# Patient Record
Sex: FEMALE | Race: WHITE | NOT HISPANIC OR LATINO | Employment: PART TIME | URBAN - METROPOLITAN AREA
[De-identification: names, ages, dates, MRNs, and addresses within clinical notes are randomized per-mention and may not be internally consistent; named-entity substitution may affect disease eponyms.]

---

## 2021-04-05 ENCOUNTER — INITIAL PRENATAL (OUTPATIENT)
Dept: OBSTETRICS AND GYNECOLOGY | Facility: CLINIC | Age: 31
End: 2021-04-05

## 2021-04-05 VITALS — WEIGHT: 172 LBS | DIASTOLIC BLOOD PRESSURE: 72 MMHG | SYSTOLIC BLOOD PRESSURE: 110 MMHG

## 2021-04-05 DIAGNOSIS — Z3A.08 8 WEEKS GESTATION OF PREGNANCY: Primary | ICD-10-CM

## 2021-04-05 LAB — PROT UR STRIP-MCNC: NEGATIVE MG/DL

## 2021-04-05 PROCEDURE — 99204 OFFICE O/P NEW MOD 45 MIN: CPT | Performed by: OBSTETRICS & GYNECOLOGY

## 2021-04-05 RX ORDER — PRENATAL VIT/IRON FUM/FOLIC AC 27MG-0.8MG
TABLET ORAL DAILY
COMMUNITY
End: 2022-12-12

## 2021-04-05 NOTE — PROGRESS NOTES
Initial ob visit     CC- Here for care of pregnancy        Mady Brown is a 30 y.o. female, , who presents for her first obstetrical visit.  Her last LMP was No LMP recorded. Patient is pregnant. Pt having some nausea, no vomiting. Taking OTC PNV. No c/o.    OB History    Para Term  AB Living   2 1 1     1   SAB TAB Ectopic Molar Multiple Live Births             1      # Outcome Date GA Lbr Roberto/2nd Weight Sex Delivery Anes PTL Lv   2 Current            1 Term      Vag-Spont   LAI       Initial positive test date : 3/4/21,        Prior obstetric issues, potential pregnancy concerns: none  Family history of genetic issues (includes FOB): none  Prior infections concerning in pregnancy (Rash, fever in last 2 weeks): none  Varicella Hx -never had virus-had vaccine  Prior testing for Cystic Fibrosis Carrier or Sickle Cell Trait- none  Prepregnancy BMI - There is no height or weight on file to calculate BMI.  History of STD: no    Additional Pertinent History   Last Pap :   Last Completed Pap Smear       Status Date      PAP SMEAR No completions recorded        History of abnormal Pap smear: no  Family history of uterine, colon, breast, or ovarian cancer: no  Performs monthly Self-Breast Exam: yes  Exercises Regularly: yes  Feelings of Anxiety or Depression: no  Tobacco Usage?: No   Alcohol/Drug Use?: NO  Over the age of 35 at delivery: no  Desires Genetic Screening: will think about it.    Patient's preferred name:Mady Occupation: homemaker  FOB's name: Danial     Occupation: student    [employer]  Past Medical History:   Diagnosis Date   • Ovarian cyst        Current Outpatient Medications:   •  Prenatal Vit-Fe Fumarate-FA (prenatal vitamin 27-0.8) 27-0.8 MG tablet tablet, Take  by mouth Daily., Disp: , Rfl:     The additional following portions of the patient's history were reviewed and updated as appropriate: allergies, current medications, past family history, past medical history, past social  history, past surgical history and problem list.    Review of Systems   Review of Systems  Current obstetric complaints : Nausea and fatigue   All systems reviewed and otherwise normal.    I have reviewed and agree with the HPI, ROS, and historical information as entered above. Radha Serrano MD    /72   Wt 78 kg (172 lb)     Physical Exam  General Appearance:    Alert, cooperative, in no acute distress,    Head:    Normocephalic, without obvious abnormality, atraumatic   Neck:   No adenopathy, supple, trachea midline, no thyromegaly   Back:     No kyphosis present, no scoliosis present,                       Lungs:     Clear to auscultation,respirations regular, even and     unlabored    Heart:    Regular rhythm and normal rate, normal S1 and S2, no            murmur, no gallop, no rub, no click       Abdomen:     Normal bowel sounds, no masses, no organomegaly, soft        non-tender, non-distended, no guarding, no rebound                 tenderness       Extremities:   Moves all extremities well, no edema, no cyanosis, no         redness   Pulses:   Pulses palpable and equal bilaterally   Skin:   No bleeding, bruising or rash   Lymph nodes:   No palpable adenopathy   Neurologic:   Sensation intact, A&O times 3      Physical Exam has been reviewed and confirmed by Radha Serrano MD    Objective   Lab Review   No data reviewed    Imaging   No data reviewed    Assessment/Plan   ASSESSMENT  1. 30 y.o. year old  at 8w4d  2. Supervision of low risk pregnancy    PLAN  1. The problem list for pregnancy was initiated today  2. Tests ordered today:  Orders Placed This Encounter   Procedures   • Urine Culture - Urine, Urine, Clean Catch     Order Specific Question:   Release to patient     Answer:   Immediate   • Chlamydia trachomatis, Neisseria gonorrhoeae, PCR w/ confirmation - Urine, Urine, Clean Catch     Order Specific Question:   Release to patient     Answer:   Immediate   • Obstetric Panel      Order Specific Question:   Release to patient     Answer:   Immediate   • HIV-1 / O / 2 Ag / Antibody 4th Generation     Order Specific Question:   Release to patient     Answer:   Immediate   • Urine Drug Screen - Urine, Clean Catch     Please confirm all positive UDS     Order Specific Question:   Release to patient     Answer:   Immediate   • POC Urinalysis Dipstick, Automated     Order Specific Question:   Release to patient     Answer:   Immediate     3. Genetic testing reviewed: she will consider the information and make a decision at a later date.  4. Information reviewed: exercise in pregnancy, nutrition in pregnancy, weight gain in pregnancy, work and travel restrictions during pregnancy, list of OTC medications acceptable in pregnancy and call coverage groups  5. Discussed new OB precautions for toxoplasmosis, dairy, heavy metals and diet/exercise.  3. Return in about 4 weeks (around 5/3/2021).     This note was electronically signed.    Radha Serrano MD  April 5, 2021

## 2021-04-07 LAB
ABO GROUP BLD: NORMAL
AMPHETAMINES UR QL SCN: NEGATIVE NG/ML
BACTERIA UR CULT: NORMAL
BACTERIA UR CULT: NORMAL
BARBITURATES UR QL SCN: NEGATIVE NG/ML
BASOPHILS # BLD AUTO: 0 X10E3/UL (ref 0–0.2)
BASOPHILS NFR BLD AUTO: 1 %
BENZODIAZ UR QL SCN: NEGATIVE NG/ML
BLD GP AB SCN SERPL QL: NEGATIVE
BZE UR QL SCN: NEGATIVE NG/ML
C TRACH RRNA SPEC QL NAA+PROBE: NEGATIVE
CANNABINOIDS UR QL SCN: NEGATIVE NG/ML
CREAT UR-MCNC: 230.3 MG/DL (ref 20–300)
EOSINOPHIL # BLD AUTO: 0.1 X10E3/UL (ref 0–0.4)
EOSINOPHIL NFR BLD AUTO: 2 %
ERYTHROCYTE [DISTWIDTH] IN BLOOD BY AUTOMATED COUNT: 12.4 % (ref 11.7–15.4)
HBV SURFACE AG SERPL QL IA: NEGATIVE
HCT VFR BLD AUTO: 42.2 % (ref 34–46.6)
HCV AB S/CO SERPL IA: <0.1 S/CO RATIO (ref 0–0.9)
HGB BLD-MCNC: 14.2 G/DL (ref 11.1–15.9)
HIV 1+2 AB+HIV1 P24 AG SERPL QL IA: NON REACTIVE
IMM GRANULOCYTES # BLD AUTO: 0 X10E3/UL (ref 0–0.1)
IMM GRANULOCYTES NFR BLD AUTO: 0 %
LABORATORY COMMENT REPORT: NORMAL
LYMPHOCYTES # BLD AUTO: 0.9 X10E3/UL (ref 0.7–3.1)
LYMPHOCYTES NFR BLD AUTO: 17 %
MCH RBC QN AUTO: 30.6 PG (ref 26.6–33)
MCHC RBC AUTO-ENTMCNC: 33.6 G/DL (ref 31.5–35.7)
MCV RBC AUTO: 91 FL (ref 79–97)
METHADONE UR QL SCN: NEGATIVE NG/ML
MONOCYTES # BLD AUTO: 0.4 X10E3/UL (ref 0.1–0.9)
MONOCYTES NFR BLD AUTO: 9 %
N GONORRHOEA RRNA SPEC QL NAA+PROBE: NEGATIVE
NEUTROPHILS # BLD AUTO: 3.6 X10E3/UL (ref 1.4–7)
NEUTROPHILS NFR BLD AUTO: 71 %
OPIATES UR QL SCN: NEGATIVE NG/ML
OXYCODONE+OXYMORPHONE UR QL SCN: NEGATIVE NG/ML
PCP UR QL: NEGATIVE NG/ML
PH UR: 5.7 [PH] (ref 4.5–8.9)
PLATELET # BLD AUTO: 202 X10E3/UL (ref 150–450)
PROPOXYPH UR QL SCN: NEGATIVE NG/ML
RBC # BLD AUTO: 4.64 X10E6/UL (ref 3.77–5.28)
RH BLD: POSITIVE
RPR SER QL: NON REACTIVE
RUBV IGG SERPL IA-ACNC: 4.47 INDEX
WBC # BLD AUTO: 5.1 X10E3/UL (ref 3.4–10.8)

## 2021-04-20 ENCOUNTER — PATIENT MESSAGE (OUTPATIENT)
Dept: OBSTETRICS AND GYNECOLOGY | Facility: CLINIC | Age: 31
End: 2021-04-20

## 2021-04-20 NOTE — TELEPHONE ENCOUNTER
From: Mady Brown  To: Radha Serrano MD  Sent: 4/20/2021 11:55 AM EDT  Subject: Non-Urgent Medical Question    I apologize if this is under the wrong subject category. I spoke with my doctor about pediatricians in the area that would accept Medicaid/Passport program. We are new to the area and I needed an opinion I can trust. I am having a hard time locating the [facility] doctor she recommended and can't remember the other two. I wanted to follow up and see if there is a recommendation that you can give me. Thank you!!

## 2021-05-03 ENCOUNTER — ROUTINE PRENATAL (OUTPATIENT)
Dept: OBSTETRICS AND GYNECOLOGY | Facility: CLINIC | Age: 31
End: 2021-05-03

## 2021-05-03 VITALS — WEIGHT: 171 LBS | SYSTOLIC BLOOD PRESSURE: 120 MMHG | DIASTOLIC BLOOD PRESSURE: 60 MMHG

## 2021-05-03 DIAGNOSIS — Z3A.12 12 WEEKS GESTATION OF PREGNANCY: Primary | ICD-10-CM

## 2021-05-03 LAB
GLUCOSE UR STRIP-MCNC: NEGATIVE MG/DL
PROT UR STRIP-MCNC: NEGATIVE MG/DL

## 2021-05-03 PROCEDURE — 99213 OFFICE O/P EST LOW 20 MIN: CPT | Performed by: OBSTETRICS & GYNECOLOGY

## 2021-05-07 LAB
CFDNA.FET/CFDNA.TOTAL SFR FETUS: NORMAL %
CITATION REF LAB TEST: NORMAL
FET 13+18+21+X+Y ANEUP PLAS.CFDNA: NEGATIVE
FET CHR 21 TS PLAS.CFDNA QL: NEGATIVE
FET SEX PLAS.CFDNA DOSAGE CFDNA: NORMAL
FET TS 13 RISK PLAS.CFDNA QL: NEGATIVE
FET TS 18 RISK WBC.DNA+CFDNA QL: NEGATIVE
GA EST FROM CONCEPTION DATE: NORMAL D
GESTATIONAL AGE > 9:: YES
LAB DIRECTOR NAME PROVIDER: NORMAL
LAB DIRECTOR NAME PROVIDER: NORMAL
LABORATORY COMMENT REPORT: NORMAL
LIMITATIONS OF THE TEST: NORMAL
NEGATIVE PREDICTIVE VALUE: NORMAL
NOTE: NORMAL
PERFORMANCE CHARACTERISTICS: NORMAL
POSITIVE PREDICTIVE VALUE: NORMAL
REF LAB TEST METHOD: NORMAL
TEST PERFORMANCE INFO SPEC: NORMAL

## 2021-05-20 PROBLEM — Z3A.12 12 WEEKS GESTATION OF PREGNANCY: Status: ACTIVE | Noted: 2021-05-20

## 2021-06-07 ENCOUNTER — ROUTINE PRENATAL (OUTPATIENT)
Dept: OBSTETRICS AND GYNECOLOGY | Facility: CLINIC | Age: 31
End: 2021-06-07

## 2021-06-07 VITALS — DIASTOLIC BLOOD PRESSURE: 60 MMHG | SYSTOLIC BLOOD PRESSURE: 110 MMHG | WEIGHT: 175 LBS

## 2021-06-07 DIAGNOSIS — O09.293 HISTORY OF MACROSOMIA IN INFANT IN PRIOR PREGNANCY, CURRENTLY PREGNANT IN THIRD TRIMESTER: ICD-10-CM

## 2021-06-07 DIAGNOSIS — Z3A.17 17 WEEKS GESTATION OF PREGNANCY: Primary | ICD-10-CM

## 2021-06-07 LAB
GLUCOSE UR STRIP-MCNC: NEGATIVE MG/DL
PROT UR STRIP-MCNC: NEGATIVE MG/DL

## 2021-06-07 PROCEDURE — 99212 OFFICE O/P EST SF 10 MIN: CPT | Performed by: OBSTETRICS & GYNECOLOGY

## 2021-06-07 NOTE — PROGRESS NOTES
OB FOLLOW UP    Mady Brown is a 30 y.o.  17w4d patient being seen today for her obstetrical follow up visit. Patient reports no complaints.     Her prenatal care is complicated by (and status) :    Patient Active Problem List   Diagnosis   • 8 weeks gestation of pregnancy   • 12 weeks gestation of pregnancy   • 17 weeks gestation of pregnancy   • History of macrosomia in infant in prior pregnancy, currently pregnant in third trimester       ROS -   Patient Reports : No Problems  Patient Denies: Loss of Fluid, Vaginal Spotting and Vision Changes  Fetal Movement : not at this gestation      /60   Wt 79.4 kg (175 lb)     Ultrasound Today: no    EXAM:  Vitals: See prenatal flowsheet   Abdomen: See prenatal flowsheet   Urine glucose/protein: See prenatal flowsheet   Pelvic: See prenatal flowsheet     Assessment    Diagnoses and all orders for this visit:    1. 17 weeks gestation of pregnancy (Primary)  -     POC Urinalysis Dipstick, Automated  -     US Ob 14 + Weeks Single or First Gestation; Future    2. History of macrosomia in infant in prior pregnancy, currently pregnant in third trimester        1. Pregnancy at 17w4d  2. Fetal status reassuring     Problem List Items Addressed This Visit     17 weeks gestation of pregnancy - Primary    Relevant Orders    POC Urinalysis Dipstick, Automated (Completed)    US Ob 14 + Weeks Single or First Gestation    History of macrosomia in infant in prior pregnancy, currently pregnant in third trimester          Plan    1. Fetal movement/ Labor Precautions  2. Declines AFP today  Return in about 3 weeks (around 2021) for Next scheduled follow up, WITH SONO.        Radha Serrano MD  2021

## 2021-06-28 ENCOUNTER — ROUTINE PRENATAL (OUTPATIENT)
Dept: OBSTETRICS AND GYNECOLOGY | Facility: CLINIC | Age: 31
End: 2021-06-28

## 2021-06-28 VITALS — WEIGHT: 175.6 LBS | DIASTOLIC BLOOD PRESSURE: 64 MMHG | SYSTOLIC BLOOD PRESSURE: 108 MMHG

## 2021-06-28 DIAGNOSIS — O26.899 PELVIC PRESSURE IN PREGNANCY: ICD-10-CM

## 2021-06-28 DIAGNOSIS — Z3A.20 20 WEEKS GESTATION OF PREGNANCY: Primary | ICD-10-CM

## 2021-06-28 DIAGNOSIS — R10.2 PELVIC PRESSURE IN PREGNANCY: ICD-10-CM

## 2021-06-28 LAB
BILIRUB BLD-MCNC: NEGATIVE MG/DL
CLARITY, POC: CLEAR
COLOR UR: NORMAL
EXPIRATION DATE: NORMAL
GLUCOSE UR STRIP-MCNC: NEGATIVE MG/DL
GLUCOSE UR STRIP-MCNC: NEGATIVE MG/DL
KETONES UR QL: NEGATIVE
LEUKOCYTE EST, POC: NEGATIVE
Lab: NORMAL
NITRITE UR-MCNC: NEGATIVE MG/ML
PH UR: 7 [PH] (ref 5–8)
PROT UR STRIP-MCNC: NEGATIVE MG/DL
PROT UR STRIP-MCNC: NEGATIVE MG/DL
RBC # UR STRIP: NEGATIVE /UL
SP GR UR: 1.02 (ref 1–1.03)
UROBILINOGEN UR QL: NORMAL

## 2021-06-28 PROCEDURE — 99213 OFFICE O/P EST LOW 20 MIN: CPT | Performed by: NURSE PRACTITIONER

## 2021-06-28 NOTE — PROGRESS NOTES
OB FOLLOW UP  CC- Here for care of pregnancy        Mady Brown is a 30 y.o.  20w4d patient being seen today for her obstetrical follow up visit. Patient reports increased pelvic and vaginal discomfort within the last week.  She denies dysuria or abnormal vaginal discharge.   She also reports mild swelling in her ankles, and occasional dizzy spells.     She denies LOF, vaginal spotting, headaches, vision changes or charlie rutherford/contractions.  She reports + fetal movement.     Her prenatal care is complicated by (and status) :    Patient Active Problem List   Diagnosis   • 8 weeks gestation of pregnancy   • 12 weeks gestation of pregnancy   • 17 weeks gestation of pregnancy   • History of macrosomia in infant in prior pregnancy, currently pregnant in third trimester     Ultrasound Today: Yes, anatomy scan.  Findings showed all anatomy appears normal, cervical length 57 mm.  I have personally evaluated the U/S and agree with the findings. CULLEN Iniguez    ROS -   Patient Reports : pelvic/vaginal discomfort, dizzy spells, swelling  Patient Denies: Loss of Fluid, Vaginal Spotting, Vision Changes, Headaches, Nausea , Vomiting , Contractions and Epigastric pain  Fetal Movement : normal  All other systems reviewed and are negative.       The additional following portions of the patient's history were reviewed and updated as appropriate: allergies, current medications, past family history, past medical history, past social history, past surgical history and problem list.    I have reviewed and agree with the HPI, ROS, and historical information as entered above. CULLEN Iniguez    /64   Wt 79.7 kg (175 lb 9.6 oz)       EXAM:     FHT: pos BPM     Pelvic Exam: No wp/koh    Urine glucose/protein: See prenatal flowsheet       Assessment and Plan    Problem List Items Addressed This Visit     None      Visit Diagnoses     20 weeks gestation of pregnancy    -  Primary    Relevant Orders    POC  Protein, Urine, Qualitative, Dipstick (Completed)    POC Glucose, Urine, Qualitative, Dipstick (Completed)    POC Urinalysis Dipstick (Completed)    Pelvic pressure in pregnancy        Relevant Orders    POC Urinalysis Dipstick (Completed)          1. Pregnancy at 20w4d  2. Fetal status reassuring.   3. Activity and Exercise discussed.  Return in about 4 weeks (around 7/26/2021) for LAYO REAL.   CCUA negative.  KOH with few yeast buds.  Treat with T3    Chasity Martinez, APRN  06/28/2021

## 2021-07-26 ENCOUNTER — ROUTINE PRENATAL (OUTPATIENT)
Dept: OBSTETRICS AND GYNECOLOGY | Facility: CLINIC | Age: 31
End: 2021-07-26

## 2021-07-26 VITALS — SYSTOLIC BLOOD PRESSURE: 110 MMHG | WEIGHT: 176 LBS | DIASTOLIC BLOOD PRESSURE: 72 MMHG

## 2021-07-26 DIAGNOSIS — Z3A.24 24 WEEKS GESTATION OF PREGNANCY: Primary | ICD-10-CM

## 2021-07-26 DIAGNOSIS — O09.293 HISTORY OF MACROSOMIA IN INFANT IN PRIOR PREGNANCY, CURRENTLY PREGNANT IN THIRD TRIMESTER: ICD-10-CM

## 2021-07-26 DIAGNOSIS — M54.30 SCIATIC PAIN, UNSPECIFIED LATERALITY: ICD-10-CM

## 2021-07-26 PROBLEM — Z3A.08 8 WEEKS GESTATION OF PREGNANCY: Status: RESOLVED | Noted: 2021-04-05 | Resolved: 2021-07-26

## 2021-07-26 PROBLEM — Z3A.12 12 WEEKS GESTATION OF PREGNANCY: Status: RESOLVED | Noted: 2021-05-20 | Resolved: 2021-07-26

## 2021-07-26 LAB
GLUCOSE UR STRIP-MCNC: NEGATIVE MG/DL
PROT UR STRIP-MCNC: NEGATIVE MG/DL

## 2021-07-26 PROCEDURE — 99213 OFFICE O/P EST LOW 20 MIN: CPT | Performed by: OBSTETRICS & GYNECOLOGY

## 2021-08-18 ENCOUNTER — TELEPHONE (OUTPATIENT)
Dept: OBSTETRICS AND GYNECOLOGY | Facility: CLINIC | Age: 31
End: 2021-08-18

## 2021-08-18 PROBLEM — M54.30: Status: ACTIVE | Noted: 2021-08-18

## 2021-08-23 ENCOUNTER — ROUTINE PRENATAL (OUTPATIENT)
Dept: OBSTETRICS AND GYNECOLOGY | Facility: CLINIC | Age: 31
End: 2021-08-23

## 2021-08-23 VITALS — WEIGHT: 178 LBS | SYSTOLIC BLOOD PRESSURE: 118 MMHG | DIASTOLIC BLOOD PRESSURE: 70 MMHG

## 2021-08-23 DIAGNOSIS — Z34.82 PRENATAL CARE, SUBSEQUENT PREGNANCY, SECOND TRIMESTER: Primary | ICD-10-CM

## 2021-08-23 LAB
GLUCOSE UR STRIP-MCNC: NEGATIVE MG/DL
PROT UR STRIP-MCNC: NEGATIVE MG/DL

## 2021-08-23 PROCEDURE — 90715 TDAP VACCINE 7 YRS/> IM: CPT | Performed by: NURSE PRACTITIONER

## 2021-08-23 PROCEDURE — 99212 OFFICE O/P EST SF 10 MIN: CPT | Performed by: NURSE PRACTITIONER

## 2021-08-23 PROCEDURE — 90471 IMMUNIZATION ADMIN: CPT | Performed by: NURSE PRACTITIONER

## 2021-08-23 NOTE — PROGRESS NOTES
OB FOLLOW UP  CC- Here for care of pregnancy        Mady Brown is a 30 y.o.  28w4d patient being seen today for her obstetrical follow up visit. Patient reports no complaints. 28wk labs/A+ TDAP Tdy. Feels well, good fm, no c/o.    Her prenatal care is complicated by (and status) :    Patient Active Problem List   Diagnosis   • 17 weeks gestation of pregnancy   • History of macrosomia in infant in prior pregnancy, currently pregnant in third trimester   • 24 weeks gestation of pregnancy   • Sciatic pain, unspecified laterality       Flu Status: Desires at future appt  Ultrasound Today: No.    ROS -   Patient Reports : No Problems  Patient Denies: Loss of Fluid, Vaginal Spotting, Vision Changes and Headaches  Fetal Movement : normal  All other systems reviewed and are negative.       The additional following portions of the patient's history were reviewed and updated as appropriate: current medications, past family history, past medical history, past social history, past surgical history and problem list.    I have reviewed and agree with the HPI, ROS, and historical information as entered above. CULLEN Plata    /70   Wt 80.7 kg (178 lb)       EXAM:     FHT: 144 BPM   Uterine Size: size equals dates  Pelvic Exam: No    Urine glucose/protein: See prenatal flowsheet       Assessment and Plan    Problem List Items Addressed This Visit     None      Visit Diagnoses     Prenatal care, subsequent pregnancy, second trimester    -  Primary    Relevant Orders    Gestational Screen 1 Hr ([facility])    Antibody Screen    CBC (No Diff)    POC Urinalysis Dipstick, Automated (Completed)          1. Pregnancy at 28w4d  2. Fetal status reassuring.   3. Activity and Exercise discussed.  MBT= A positive  RTC in 4 weeks with CULLEN Sharif  2021   OB FOLLOW UP  CC- Here for care of pregnancy        Mady Brown is a 30 y.o.  28w4d patient being seen today for her obstetrical  follow up visit. Patient reports no complaints. 28wk labs/A+ TDAP TDY. Pt feels well, good fm, no c/o    Her prenatal care is complicated by (and status) :    Patient Active Problem List   Diagnosis   • 17 weeks gestation of pregnancy   • History of macrosomia in infant in prior pregnancy, currently pregnant in third trimester   • 24 weeks gestation of pregnancy   • Sciatic pain, unspecified laterality       Flu Status: {Current Flu Status:68076}  Ultrasound Today: No.    ROS -   Patient Reports : No Problems  Patient Denies: Loss of Fluid, Vaginal Spotting, Vision Changes and Headaches  Fetal Movement : normal  All other systems reviewed and are negative.       The additional following portions of the patient's history were reviewed and updated as appropriate: current medications, past family history, past medical history, past social history, past surgical history and problem list.    I have reviewed and agree with the HPI, ROS, and historical information as entered above. CULELN Plata    /70   Wt 80.7 kg (178 lb)       EXAM:     FHT: *** BPM   Uterine Size: {fundal height:37475}  Pelvic Exam: {OB Pelvic Performed?:90062}    Urine glucose/protein: See prenatal flowsheet       Assessment and Plan    Problem List Items Addressed This Visit     None      Visit Diagnoses     Prenatal care, subsequent pregnancy, second trimester    -  Primary    Relevant Orders    Gestational Screen 1 Hr ([facility])    Antibody Screen    CBC (No Diff)    POC Urinalysis Dipstick, Automated (Completed)          4. Pregnancy at 28w4d  5. Fetal status reassuring.   6. Activity and Exercise discussed.  Return in about 4 weeks (around 9/20/2021).    CULLEN Plata  08/23/2021   OB FOLLOW UP  CC- Here for care of pregnancy

## 2021-08-23 NOTE — PROGRESS NOTES
OB FOLLOW UP    Mady Brown is a 30 y.o.  28w4d patient being seen today for her obstetrical follow up visit. Patient reports no complaints. 28 week labs today. MBT= A positive,  Tdap vaccine given today. Feels well, reports good daily fetal movement.     Her prenatal care is complicated by (and status) :    Patient Active Problem List   Diagnosis   • 17 weeks gestation of pregnancy   • History of macrosomia in infant in prior pregnancy, currently pregnant in third trimester   • 24 weeks gestation of pregnancy   • Sciatic pain, unspecified laterality       ROS -   Patient Reports : No Problems  Patient Denies:  Loss of Fluid, Vaginal Spotting, Vision Changes, Headaches and Cramping/Contractions   Fetal Movement : normal    I have reviewed and agree with the HPI, ROS, and historical information as entered above. Barbara Bergeron, CULLEN    /70   Wt 80.7 kg (178 lb)     Ultrasound Today: no    EXAM:  Vitals: See prenatal flowsheet   Abdomen: See prenatal flowsheet   Urine glucose/protein: Negative/ negative   HRT: 144bpm   Presentation: Not examined       Pelvic: No     Assessment    1. Pregnancy at 28w4d  2. Fetal status reassuring     Problem List Items Addressed This Visit     None      Visit Diagnoses     Prenatal care, subsequent pregnancy, second trimester    -  Primary    Relevant Orders    Gestational Screen 1 Hr ([facility])    Antibody Screen    CBC (No Diff)    POC Urinalysis Dipstick, Automated (Completed)          Plan    1. 28 week labs today  2. MBT= A positve  3. Tdap vaccine given today  4. Monitor daily fetal movements  5. Follow up: 4 week(s) with .       CULLEN Plata  2021

## 2021-08-24 LAB
BLD GP AB SCN SERPL QL: NEGATIVE
ERYTHROCYTE [DISTWIDTH] IN BLOOD BY AUTOMATED COUNT: 12.2 % (ref 12.3–15.4)
GLUCOSE 1H P 50 G GLC PO SERPL-MCNC: 120 MG/DL (ref 65–139)
HCT VFR BLD AUTO: 34 % (ref 34–46.6)
HGB BLD-MCNC: 11.2 G/DL (ref 12–15.9)
MCH RBC QN AUTO: 29.2 PG (ref 26.6–33)
MCHC RBC AUTO-ENTMCNC: 32.9 G/DL (ref 31.5–35.7)
MCV RBC AUTO: 88.8 FL (ref 79–97)
PLATELET # BLD AUTO: 187 10*3/MM3 (ref 140–450)
RBC # BLD AUTO: 3.83 10*6/MM3 (ref 3.77–5.28)
WBC # BLD AUTO: 6.68 10*3/MM3 (ref 3.4–10.8)

## 2021-09-13 ENCOUNTER — TELEPHONE (OUTPATIENT)
Dept: PHYSICAL THERAPY | Facility: CLINIC | Age: 31
End: 2021-09-13

## 2021-09-20 ENCOUNTER — ROUTINE PRENATAL (OUTPATIENT)
Dept: OBSTETRICS AND GYNECOLOGY | Facility: CLINIC | Age: 31
End: 2021-09-20

## 2021-09-20 VITALS — SYSTOLIC BLOOD PRESSURE: 118 MMHG | DIASTOLIC BLOOD PRESSURE: 58 MMHG | WEIGHT: 177 LBS

## 2021-09-20 DIAGNOSIS — Z3A.32 32 WEEKS GESTATION OF PREGNANCY: Primary | ICD-10-CM

## 2021-09-20 LAB
GLUCOSE UR STRIP-MCNC: NEGATIVE MG/DL
PROT UR STRIP-MCNC: ABNORMAL MG/DL

## 2021-09-20 PROCEDURE — 99212 OFFICE O/P EST SF 10 MIN: CPT | Performed by: OBSTETRICS & GYNECOLOGY

## 2021-09-20 NOTE — PROGRESS NOTES
OB FOLLOW UP    Mady Brown is a 31 y.o.  32w4d patient being seen today for her obstetrical follow up visit. Patient reports still having vaginal/pelvic discomfort issues but they are much improved. Pt was referred to PT for vaginal pain and there was some communication errors and she did not return. Good fm, no other c/o    Her prenatal care is complicated by (and status) :    Patient Active Problem List   Diagnosis   • 17 weeks gestation of pregnancy   • History of macrosomia in infant in prior pregnancy, currently pregnant in third trimester   • 24 weeks gestation of pregnancy   • Sciatic pain, unspecified laterality   • 32 weeks gestation of pregnancy       ROS -   Patient Reports : No Problems  Patient Denies: Loss of Fluid, Vaginal Spotting, Vision Changes, Headaches and Cramping/Contractions   Fetal Movement : normal      /58   Wt 80.3 kg (177 lb)     Ultrasound Today: no    EXAM:  Vitals: See prenatal flowsheet   Abdomen: See prenatal flowsheet   Urine glucose/protein: See prenatal flowsheet   Pelvic: See prenatal flowsheet     Assessment    Diagnoses and all orders for this visit:    1. 32 weeks gestation of pregnancy (Primary)  -     POC Urinalysis Dipstick, Automated        1. Pregnancy at 32w4d  2. Fetal status reassuring     Problem List Items Addressed This Visit     32 weeks gestation of pregnancy - Primary    Relevant Orders    POC Urinalysis Dipstick, Automated (Completed)          Plan    1. Fetal movement/ Labor Precautions  Discussed/encouraged social distancing/COVID19 precautions; encouraged vaccination when able  Diet/exercise precautions  Follow Up: Return in about 2 weeks (around 10/4/2021) for Next scheduled follow up.        Radha Serrano MD  2021

## 2021-10-11 ENCOUNTER — ROUTINE PRENATAL (OUTPATIENT)
Dept: OBSTETRICS AND GYNECOLOGY | Facility: CLINIC | Age: 31
End: 2021-10-11

## 2021-10-11 VITALS — SYSTOLIC BLOOD PRESSURE: 100 MMHG | DIASTOLIC BLOOD PRESSURE: 57 MMHG | WEIGHT: 179.4 LBS

## 2021-10-11 DIAGNOSIS — Z3A.35 35 WEEKS GESTATION OF PREGNANCY: Primary | ICD-10-CM

## 2021-10-11 LAB
GLUCOSE UR STRIP-MCNC: NEGATIVE MG/DL
PROT UR STRIP-MCNC: NEGATIVE MG/DL

## 2021-10-11 PROCEDURE — 99212 OFFICE O/P EST SF 10 MIN: CPT | Performed by: OBSTETRICS & GYNECOLOGY

## 2021-10-11 PROCEDURE — 90471 IMMUNIZATION ADMIN: CPT | Performed by: OBSTETRICS & GYNECOLOGY

## 2021-10-11 PROCEDURE — 90686 IIV4 VACC NO PRSV 0.5 ML IM: CPT | Performed by: OBSTETRICS & GYNECOLOGY

## 2021-10-11 RX ORDER — COVID-19 TEST SPECIMEN COLLECT
MISCELLANEOUS MISCELLANEOUS SEE ADMIN INSTRUCTIONS
COMMUNITY
Start: 2021-10-04 | End: 2021-11-11 | Stop reason: HOSPADM

## 2021-10-11 NOTE — PROGRESS NOTES
OB FOLLOW UP    Mady Brown is a 31 y.o.  35w4d patient being seen today for her obstetrical follow up visit. Patient reports BH and did have some dizziness that occur around a minute that started last week.  States she does rest and they go away.      Her prenatal care is complicated by (and status) :  Patient states some dizziness   Patient Active Problem List   Diagnosis   • 17 weeks gestation of pregnancy   • History of macrosomia in infant in prior pregnancy, currently pregnant in third trimester   • 24 weeks gestation of pregnancy   • Sciatic pain, unspecified laterality   • 32 weeks gestation of pregnancy   • 35 weeks gestation of pregnancy       ROS -   Patient Reports : Cramping/Contractions  and and Dizziness   Patient Denies: Loss of Fluid, Vaginal Spotting, Vision Changes and Headaches  Fetal Movement : normal      Flu vaccine today     /57   Wt 81.4 kg (179 lb 6.4 oz)     Ultrasound Today: no    EXAM:  Vitals: See prenatal flowsheet   Abdomen: See prenatal flowsheet   Urine glucose/protein: See prenatal flowsheet   Pelvic: See prenatal flowsheet     Assessment    Diagnoses and all orders for this visit:    1. 35 weeks gestation of pregnancy (Primary)  -     POC Urinalysis Dipstick  -     FluLaval/Fluarix >6 Months (3419-8578)        1. Pregnancy at 35w4d  2. Fetal status reassuring     Problem List Items Addressed This Visit     35 weeks gestation of pregnancy - Primary    Relevant Orders    POC Urinalysis Dipstick (Completed)    FluLaval/Fluarix >6 Months (9191-6190) (Completed)          Plan    1. Fetal movement/ Labor Precautions  2. Answered questions re: visitation in hospital etc.  Diet/exercise precautions  Follow Up: Return in about 2 weeks (around 10/25/2021).        Radha Serrano MD  10/11/2021

## 2021-10-21 PROBLEM — Z3A.35 35 WEEKS GESTATION OF PREGNANCY: Status: ACTIVE | Noted: 2021-10-21

## 2021-10-25 ENCOUNTER — ROUTINE PRENATAL (OUTPATIENT)
Dept: OBSTETRICS AND GYNECOLOGY | Facility: CLINIC | Age: 31
End: 2021-10-25

## 2021-10-25 VITALS — WEIGHT: 180 LBS | DIASTOLIC BLOOD PRESSURE: 70 MMHG | SYSTOLIC BLOOD PRESSURE: 100 MMHG

## 2021-10-25 DIAGNOSIS — Z34.83 PRENATAL CARE, SUBSEQUENT PREGNANCY, THIRD TRIMESTER: Primary | ICD-10-CM

## 2021-10-25 LAB
GLUCOSE UR STRIP-MCNC: NEGATIVE MG/DL
PROT UR STRIP-MCNC: NEGATIVE MG/DL

## 2021-10-25 PROCEDURE — 99213 OFFICE O/P EST LOW 20 MIN: CPT | Performed by: OBSTETRICS & GYNECOLOGY

## 2021-10-25 PROCEDURE — 87081 CULTURE SCREEN ONLY: CPT | Performed by: OBSTETRICS & GYNECOLOGY

## 2021-10-28 LAB — BACTERIA SPEC AEROBE CULT: NORMAL

## 2021-11-01 ENCOUNTER — ROUTINE PRENATAL (OUTPATIENT)
Dept: OBSTETRICS AND GYNECOLOGY | Facility: CLINIC | Age: 31
End: 2021-11-01

## 2021-11-01 ENCOUNTER — PREP FOR SURGERY (OUTPATIENT)
Dept: OTHER | Facility: HOSPITAL | Age: 31
End: 2021-11-01

## 2021-11-01 VITALS — WEIGHT: 181 LBS | DIASTOLIC BLOOD PRESSURE: 80 MMHG | SYSTOLIC BLOOD PRESSURE: 120 MMHG

## 2021-11-01 DIAGNOSIS — Z3A.38 38 WEEKS GESTATION OF PREGNANCY: Primary | ICD-10-CM

## 2021-11-01 PROBLEM — Z3A.17 17 WEEKS GESTATION OF PREGNANCY: Status: RESOLVED | Noted: 2021-06-07 | Resolved: 2021-11-01

## 2021-11-01 PROBLEM — Z3A.24 24 WEEKS GESTATION OF PREGNANCY: Status: RESOLVED | Noted: 2021-07-26 | Resolved: 2021-11-01

## 2021-11-01 PROBLEM — Z3A.32 32 WEEKS GESTATION OF PREGNANCY: Status: RESOLVED | Noted: 2021-09-20 | Resolved: 2021-11-01

## 2021-11-01 PROBLEM — Z3A.35 35 WEEKS GESTATION OF PREGNANCY: Status: RESOLVED | Noted: 2021-10-21 | Resolved: 2021-11-01

## 2021-11-01 LAB
GLUCOSE UR STRIP-MCNC: NEGATIVE MG/DL
PROT UR STRIP-MCNC: NEGATIVE MG/DL

## 2021-11-01 PROCEDURE — 99213 OFFICE O/P EST LOW 20 MIN: CPT | Performed by: OBSTETRICS & GYNECOLOGY

## 2021-11-01 RX ORDER — SODIUM CHLORIDE 0.9 % (FLUSH) 0.9 %
3 SYRINGE (ML) INJECTION EVERY 12 HOURS SCHEDULED
Status: CANCELLED | OUTPATIENT
Start: 2021-11-01

## 2021-11-01 RX ORDER — SODIUM CHLORIDE 0.9 % (FLUSH) 0.9 %
3-10 SYRINGE (ML) INJECTION AS NEEDED
Status: CANCELLED | OUTPATIENT
Start: 2021-11-01

## 2021-11-01 RX ORDER — LIDOCAINE HYDROCHLORIDE 10 MG/ML
5 INJECTION, SOLUTION EPIDURAL; INFILTRATION; INTRACAUDAL; PERINEURAL AS NEEDED
Status: CANCELLED | OUTPATIENT
Start: 2021-11-01

## 2021-11-01 RX ORDER — MISOPROSTOL 200 UG/1
800 TABLET ORAL AS NEEDED
Status: CANCELLED | OUTPATIENT
Start: 2021-11-01

## 2021-11-01 RX ORDER — OXYTOCIN-SODIUM CHLORIDE 0.9% IV SOLN 30 UNIT/500ML 30-0.9/5 UT/ML-%
2-30 SOLUTION INTRAVENOUS
Status: CANCELLED | OUTPATIENT
Start: 2021-11-01

## 2021-11-01 RX ORDER — OXYTOCIN-SODIUM CHLORIDE 0.9% IV SOLN 30 UNIT/500ML 30-0.9/5 UT/ML-%
85 SOLUTION INTRAVENOUS ONCE
Status: CANCELLED | OUTPATIENT
Start: 2021-11-01 | End: 2021-11-01

## 2021-11-01 RX ORDER — IBUPROFEN 600 MG/1
600 TABLET ORAL EVERY 6 HOURS PRN
Status: CANCELLED | OUTPATIENT
Start: 2021-11-01

## 2021-11-01 RX ORDER — ONDANSETRON 2 MG/ML
4 INJECTION INTRAMUSCULAR; INTRAVENOUS EVERY 6 HOURS PRN
Status: CANCELLED | OUTPATIENT
Start: 2021-11-01

## 2021-11-01 RX ORDER — CARBOPROST TROMETHAMINE 250 UG/ML
250 INJECTION, SOLUTION INTRAMUSCULAR AS NEEDED
Status: CANCELLED | OUTPATIENT
Start: 2021-11-01

## 2021-11-01 RX ORDER — MISOPROSTOL 100 MCG
25 TABLET ORAL
Status: CANCELLED | OUTPATIENT
Start: 2021-11-01

## 2021-11-01 RX ORDER — ACETAMINOPHEN 325 MG/1
650 TABLET ORAL EVERY 4 HOURS PRN
Status: CANCELLED | OUTPATIENT
Start: 2021-11-01

## 2021-11-01 RX ORDER — METHYLERGONOVINE MALEATE 0.2 MG/ML
200 INJECTION INTRAVENOUS ONCE AS NEEDED
Status: CANCELLED | OUTPATIENT
Start: 2021-11-01

## 2021-11-01 RX ORDER — SODIUM CHLORIDE, SODIUM LACTATE, POTASSIUM CHLORIDE, CALCIUM CHLORIDE 600; 310; 30; 20 MG/100ML; MG/100ML; MG/100ML; MG/100ML
125 INJECTION, SOLUTION INTRAVENOUS CONTINUOUS
Status: CANCELLED | OUTPATIENT
Start: 2021-11-01

## 2021-11-01 RX ORDER — ONDANSETRON 4 MG/1
4 TABLET, FILM COATED ORAL EVERY 6 HOURS PRN
Status: CANCELLED | OUTPATIENT
Start: 2021-11-01

## 2021-11-01 RX ORDER — MAGNESIUM CARB/ALUMINUM HYDROX 105-160MG
30 TABLET,CHEWABLE ORAL ONCE
Status: CANCELLED | OUTPATIENT
Start: 2021-11-01 | End: 2021-11-01

## 2021-11-01 RX ORDER — BUTORPHANOL TARTRATE 1 MG/ML
1 INJECTION, SOLUTION INTRAMUSCULAR; INTRAVENOUS
Status: CANCELLED | OUTPATIENT
Start: 2021-11-01

## 2021-11-01 RX ORDER — OXYTOCIN-SODIUM CHLORIDE 0.9% IV SOLN 30 UNIT/500ML 30-0.9/5 UT/ML-%
650 SOLUTION INTRAVENOUS ONCE
Status: CANCELLED | OUTPATIENT
Start: 2021-11-01 | End: 2021-11-01

## 2021-11-01 NOTE — PROGRESS NOTES
OB FOLLOW UP    Mady Brown is a 31 y.o.  38w4d patient being seen today for her obstetrical follow up visit. Patient reports no complaints. Pt feels well, good fm, occ ctx. GBS -    Her prenatal care is complicated by (and status) :    Patient Active Problem List   Diagnosis   • 17 weeks gestation of pregnancy   • History of macrosomia in infant in prior pregnancy, currently pregnant in third trimester   • 24 weeks gestation of pregnancy   • Sciatic pain, unspecified laterality   • 32 weeks gestation of pregnancy   • 35 weeks gestation of pregnancy       ROS -   Patient Reports : Cramping/Contractions   Patient Denies: Loss of Fluid, Vaginal Spotting, Vision Changes and Headaches  Fetal Movement : normal      /80   Wt 82.1 kg (181 lb)     Ultrasound Today: yes    EXAM:  Vitals: See prenatal flowsheet   Abdomen: See prenatal flowsheet   Urine glucose/protein: See prenatal flowsheet   Pelvic: See prenatal flowsheet     Assessment    Diagnoses and all orders for this visit:    1. 38 weeks gestation of pregnancy (Primary)  -     POC Urinalysis Dipstick        1. Pregnancy at 38w4d  2. Fetal status reassuring     Problem List Items Addressed This Visit     None      Visit Diagnoses     38 weeks gestation of pregnancy    -  Primary    Relevant Orders    POC Urinalysis Dipstick (Completed)          Plan    1. Fetal movement/Labor Precautions  2. Has planned IOL.  Reviewed GBS neg  Diet/exercise precautions  Follow Up: Return in about 1 week (around 2021) for Next scheduled follow up.        Lesly Guy MA  2021

## 2021-11-02 ENCOUNTER — TELEPHONE (OUTPATIENT)
Dept: OBSTETRICS AND GYNECOLOGY | Facility: CLINIC | Age: 31
End: 2021-11-02

## 2021-11-02 NOTE — TELEPHONE ENCOUNTER
lvom for pt to let her know that her covid test will be done upon admission on 11/8 at L&D and will be a rapid test, if she has any other questions or concerns to give us a CB.

## 2021-11-08 ENCOUNTER — HOSPITAL ENCOUNTER (INPATIENT)
Facility: HOSPITAL | Age: 31
LOS: 3 days | Discharge: HOME | End: 2021-11-11
Attending: OBSTETRICS & GYNECOLOGY | Admitting: OBSTETRICS & GYNECOLOGY

## 2021-11-08 ENCOUNTER — HOSPITAL ENCOUNTER (OUTPATIENT)
Dept: LABOR AND DELIVERY | Facility: HOSPITAL | Age: 31
Discharge: HOME | End: 2021-11-08

## 2021-11-08 PROBLEM — Z34.90 PREGNANT: Status: ACTIVE | Noted: 2021-11-08

## 2021-11-08 LAB
ABO GROUP BLD: NORMAL
ABO GROUP BLD: NORMAL
ALP SERPL-CCNC: 160 U/L (ref 39–117)
ALT SERPL W P-5'-P-CCNC: 9 U/L (ref 1–33)
AST SERPL-CCNC: 17 U/L (ref 1–32)
BILIRUB SERPL-MCNC: 0.3 MG/DL (ref 0–1.2)
BLD GP AB SCN SERPL QL: NEGATIVE
CREAT SERPL-MCNC: 0.54 MG/DL (ref 0.57–1)
DEPRECATED RDW RBC AUTO: 43.8 FL (ref 37–54)
ERYTHROCYTE [DISTWIDTH] IN BLOOD BY AUTOMATED COUNT: 14.2 % (ref 12.3–15.4)
HCT VFR BLD AUTO: 32.9 % (ref 34–46.6)
HGB BLD-MCNC: 10.4 G/DL (ref 12–15.9)
LDH SERPL-CCNC: 175 U/L (ref 135–214)
MCH RBC QN AUTO: 27.2 PG (ref 26.6–33)
MCHC RBC AUTO-ENTMCNC: 31.6 G/DL (ref 31.5–35.7)
MCV RBC AUTO: 86.1 FL (ref 79–97)
PLATELET # BLD AUTO: 195 10*3/MM3 (ref 140–450)
PMV BLD AUTO: 10.9 FL (ref 6–12)
RBC # BLD AUTO: 3.82 10*6/MM3 (ref 3.77–5.28)
RH BLD: POSITIVE
RH BLD: POSITIVE
SARS-COV-2 RDRP RESP QL NAA+PROBE: NORMAL
T&S EXPIRATION DATE: NORMAL
URATE SERPL-MCNC: 3.1 MG/DL (ref 2.4–5.7)
WBC # BLD AUTO: 6.34 10*3/MM3 (ref 3.4–10.8)

## 2021-11-08 PROCEDURE — 82565 ASSAY OF CREATININE: CPT | Performed by: OBSTETRICS & GYNECOLOGY

## 2021-11-08 PROCEDURE — 25010000002 BUTORPHANOL PER 1 MG: Performed by: OBSTETRICS & GYNECOLOGY

## 2021-11-08 PROCEDURE — 84075 ASSAY ALKALINE PHOSPHATASE: CPT | Performed by: OBSTETRICS & GYNECOLOGY

## 2021-11-08 PROCEDURE — 83615 LACTATE (LD) (LDH) ENZYME: CPT | Performed by: OBSTETRICS & GYNECOLOGY

## 2021-11-08 PROCEDURE — 59200 INSERT CERVICAL DILATOR: CPT | Performed by: OBSTETRICS & GYNECOLOGY

## 2021-11-08 PROCEDURE — 86850 RBC ANTIBODY SCREEN: CPT | Performed by: OBSTETRICS & GYNECOLOGY

## 2021-11-08 PROCEDURE — 87635 SARS-COV-2 COVID-19 AMP PRB: CPT | Performed by: OBSTETRICS & GYNECOLOGY

## 2021-11-08 PROCEDURE — 84450 TRANSFERASE (AST) (SGOT): CPT | Performed by: OBSTETRICS & GYNECOLOGY

## 2021-11-08 PROCEDURE — 86901 BLOOD TYPING SEROLOGIC RH(D): CPT | Performed by: OBSTETRICS & GYNECOLOGY

## 2021-11-08 PROCEDURE — 84550 ASSAY OF BLOOD/URIC ACID: CPT | Performed by: OBSTETRICS & GYNECOLOGY

## 2021-11-08 PROCEDURE — 86900 BLOOD TYPING SEROLOGIC ABO: CPT

## 2021-11-08 PROCEDURE — 59025 FETAL NON-STRESS TEST: CPT

## 2021-11-08 PROCEDURE — 0KQM0ZZ REPAIR PERINEUM MUSCLE, OPEN APPROACH: ICD-10-PCS | Performed by: OBSTETRICS & GYNECOLOGY

## 2021-11-08 PROCEDURE — 85027 COMPLETE CBC AUTOMATED: CPT | Performed by: OBSTETRICS & GYNECOLOGY

## 2021-11-08 PROCEDURE — 86901 BLOOD TYPING SEROLOGIC RH(D): CPT

## 2021-11-08 PROCEDURE — 82247 BILIRUBIN TOTAL: CPT | Performed by: OBSTETRICS & GYNECOLOGY

## 2021-11-08 PROCEDURE — 86900 BLOOD TYPING SEROLOGIC ABO: CPT | Performed by: OBSTETRICS & GYNECOLOGY

## 2021-11-08 PROCEDURE — 84460 ALANINE AMINO (ALT) (SGPT): CPT | Performed by: OBSTETRICS & GYNECOLOGY

## 2021-11-08 RX ORDER — OXYTOCIN-SODIUM CHLORIDE 0.9% IV SOLN 30 UNIT/500ML 30-0.9/5 UT/ML-%
2-30 SOLUTION INTRAVENOUS
Status: DISCONTINUED | OUTPATIENT
Start: 2021-11-09 | End: 2021-11-08

## 2021-11-08 RX ORDER — MAGNESIUM CARB/ALUMINUM HYDROX 105-160MG
30 TABLET,CHEWABLE ORAL ONCE
Status: DISCONTINUED | OUTPATIENT
Start: 2021-11-08 | End: 2021-11-09 | Stop reason: HOSPADM

## 2021-11-08 RX ORDER — MAGNESIUM CARB/ALUMINUM HYDROX 105-160MG
TABLET,CHEWABLE ORAL
Status: DISCONTINUED
Start: 2021-11-08 | End: 2021-11-11 | Stop reason: HOSPADM

## 2021-11-08 RX ORDER — ONDANSETRON 4 MG/1
4 TABLET, FILM COATED ORAL EVERY 6 HOURS PRN
Status: DISCONTINUED | OUTPATIENT
Start: 2021-11-08 | End: 2021-11-09 | Stop reason: HOSPADM

## 2021-11-08 RX ORDER — ERYTHROMYCIN 5 MG/G
OINTMENT OPHTHALMIC
Status: DISCONTINUED
Start: 2021-11-08 | End: 2021-11-08

## 2021-11-08 RX ORDER — SODIUM CHLORIDE 0.9 % (FLUSH) 0.9 %
3-10 SYRINGE (ML) INJECTION AS NEEDED
Status: DISCONTINUED | OUTPATIENT
Start: 2021-11-08 | End: 2021-11-09 | Stop reason: HOSPADM

## 2021-11-08 RX ORDER — OXYTOCIN-SODIUM CHLORIDE 0.9% IV SOLN 30 UNIT/500ML 30-0.9/5 UT/ML-%
2-30 SOLUTION INTRAVENOUS
Status: DISCONTINUED | OUTPATIENT
Start: 2021-11-09 | End: 2021-11-09 | Stop reason: HOSPADM

## 2021-11-08 RX ORDER — ONDANSETRON 2 MG/ML
4 INJECTION INTRAMUSCULAR; INTRAVENOUS EVERY 6 HOURS PRN
Status: DISCONTINUED | OUTPATIENT
Start: 2021-11-08 | End: 2021-11-09 | Stop reason: HOSPADM

## 2021-11-08 RX ORDER — BUTORPHANOL TARTRATE 1 MG/ML
1 INJECTION, SOLUTION INTRAMUSCULAR; INTRAVENOUS
Status: DISCONTINUED | OUTPATIENT
Start: 2021-11-08 | End: 2021-11-09 | Stop reason: HOSPADM

## 2021-11-08 RX ORDER — LIDOCAINE HYDROCHLORIDE 10 MG/ML
5 INJECTION, SOLUTION EPIDURAL; INFILTRATION; INTRACAUDAL; PERINEURAL AS NEEDED
Status: DISCONTINUED | OUTPATIENT
Start: 2021-11-08 | End: 2021-11-09 | Stop reason: HOSPADM

## 2021-11-08 RX ORDER — SODIUM CHLORIDE 0.9 % (FLUSH) 0.9 %
3 SYRINGE (ML) INJECTION EVERY 12 HOURS SCHEDULED
Status: DISCONTINUED | OUTPATIENT
Start: 2021-11-08 | End: 2021-11-09 | Stop reason: HOSPADM

## 2021-11-08 RX ORDER — SODIUM CHLORIDE, SODIUM LACTATE, POTASSIUM CHLORIDE, CALCIUM CHLORIDE 600; 310; 30; 20 MG/100ML; MG/100ML; MG/100ML; MG/100ML
125 INJECTION, SOLUTION INTRAVENOUS CONTINUOUS
Status: DISCONTINUED | OUTPATIENT
Start: 2021-11-08 | End: 2021-11-11 | Stop reason: HOSPADM

## 2021-11-08 RX ADMIN — SODIUM CHLORIDE, POTASSIUM CHLORIDE, SODIUM LACTATE AND CALCIUM CHLORIDE 125 ML/HR: 600; 310; 30; 20 INJECTION, SOLUTION INTRAVENOUS at 23:14

## 2021-11-08 RX ADMIN — BUTORPHANOL TARTRATE 1 MG: 1 INJECTION, SOLUTION INTRAMUSCULAR; INTRAVENOUS at 19:43

## 2021-11-08 RX ADMIN — SODIUM CHLORIDE, POTASSIUM CHLORIDE, SODIUM LACTATE AND CALCIUM CHLORIDE 125 ML/HR: 600; 310; 30; 20 INJECTION, SOLUTION INTRAVENOUS at 17:55

## 2021-11-09 ENCOUNTER — ANESTHESIA (OUTPATIENT)
Dept: LABOR AND DELIVERY | Facility: HOSPITAL | Age: 31
End: 2021-11-09

## 2021-11-09 ENCOUNTER — ANESTHESIA EVENT (OUTPATIENT)
Dept: LABOR AND DELIVERY | Facility: HOSPITAL | Age: 31
End: 2021-11-09

## 2021-11-09 PROCEDURE — C1755 CATHETER, INTRASPINAL: HCPCS | Performed by: ANESTHESIOLOGY

## 2021-11-09 PROCEDURE — 25010000002 ROPIVACAINE PER 1 MG: Performed by: NURSE ANESTHETIST, CERTIFIED REGISTERED

## 2021-11-09 PROCEDURE — 59025 FETAL NON-STRESS TEST: CPT

## 2021-11-09 PROCEDURE — C1755 CATHETER, INTRASPINAL: HCPCS

## 2021-11-09 PROCEDURE — 51702 INSERT TEMP BLADDER CATH: CPT

## 2021-11-09 PROCEDURE — 25010000002 FENTANYL CITRATE (PF) 50 MCG/ML SOLUTION: Performed by: NURSE ANESTHETIST, CERTIFIED REGISTERED

## 2021-11-09 PROCEDURE — 59410 OBSTETRICAL CARE: CPT | Performed by: OBSTETRICS & GYNECOLOGY

## 2021-11-09 PROCEDURE — 25010000002 BUTORPHANOL PER 1 MG: Performed by: OBSTETRICS & GYNECOLOGY

## 2021-11-09 RX ORDER — DOCUSATE SODIUM 100 MG/1
100 CAPSULE, LIQUID FILLED ORAL 2 TIMES DAILY
Status: DISCONTINUED | OUTPATIENT
Start: 2021-11-09 | End: 2021-11-11 | Stop reason: HOSPADM

## 2021-11-09 RX ORDER — OXYTOCIN-SODIUM CHLORIDE 0.9% IV SOLN 30 UNIT/500ML 30-0.9/5 UT/ML-%
650 SOLUTION INTRAVENOUS ONCE
Status: DISCONTINUED | OUTPATIENT
Start: 2021-11-09 | End: 2021-11-09 | Stop reason: HOSPADM

## 2021-11-09 RX ORDER — ROPIVACAINE HYDROCHLORIDE 2 MG/ML
15 INJECTION, SOLUTION EPIDURAL; INFILTRATION; PERINEURAL CONTINUOUS
Status: DISCONTINUED | OUTPATIENT
Start: 2021-11-09 | End: 2021-11-11 | Stop reason: HOSPADM

## 2021-11-09 RX ORDER — FENTANYL CITRATE 50 UG/ML
INJECTION, SOLUTION INTRAMUSCULAR; INTRAVENOUS AS NEEDED
Status: DISCONTINUED | OUTPATIENT
Start: 2021-11-09 | End: 2021-11-09 | Stop reason: SURG

## 2021-11-09 RX ORDER — EPHEDRINE SULFATE 5 MG/ML
INJECTION INTRAVENOUS
Status: DISCONTINUED
Start: 2021-11-09 | End: 2021-11-11 | Stop reason: HOSPADM

## 2021-11-09 RX ORDER — ACETAMINOPHEN 325 MG/1
650 TABLET ORAL EVERY 4 HOURS PRN
Status: DISCONTINUED | OUTPATIENT
Start: 2021-11-09 | End: 2021-11-09 | Stop reason: HOSPADM

## 2021-11-09 RX ORDER — LANOLIN
CREAM (ML) TOPICAL
Status: DISCONTINUED | OUTPATIENT
Start: 2021-11-09 | End: 2021-11-11 | Stop reason: HOSPADM

## 2021-11-09 RX ORDER — MISOPROSTOL 200 UG/1
800 TABLET ORAL AS NEEDED
Status: DISCONTINUED | OUTPATIENT
Start: 2021-11-09 | End: 2021-11-09 | Stop reason: HOSPADM

## 2021-11-09 RX ORDER — HYDROCORTISONE 25 MG/G
1 CREAM TOPICAL AS NEEDED
Status: DISCONTINUED | OUTPATIENT
Start: 2021-11-09 | End: 2021-11-11 | Stop reason: HOSPADM

## 2021-11-09 RX ORDER — IBUPROFEN 600 MG/1
600 TABLET ORAL EVERY 6 HOURS SCHEDULED
Status: DISCONTINUED | OUTPATIENT
Start: 2021-11-09 | End: 2021-11-11 | Stop reason: HOSPADM

## 2021-11-09 RX ORDER — FAMOTIDINE 10 MG/ML
20 INJECTION, SOLUTION INTRAVENOUS ONCE AS NEEDED
Status: DISCONTINUED | OUTPATIENT
Start: 2021-11-09 | End: 2021-11-09 | Stop reason: HOSPADM

## 2021-11-09 RX ORDER — METOCLOPRAMIDE HYDROCHLORIDE 5 MG/ML
10 INJECTION INTRAMUSCULAR; INTRAVENOUS ONCE AS NEEDED
Status: DISCONTINUED | OUTPATIENT
Start: 2021-11-09 | End: 2021-11-09 | Stop reason: HOSPADM

## 2021-11-09 RX ORDER — TRISODIUM CITRATE DIHYDRATE AND CITRIC ACID MONOHYDRATE 500; 334 MG/5ML; MG/5ML
30 SOLUTION ORAL ONCE
Status: DISCONTINUED | OUTPATIENT
Start: 2021-11-09 | End: 2021-11-09 | Stop reason: HOSPADM

## 2021-11-09 RX ORDER — IBUPROFEN 600 MG/1
TABLET ORAL
Status: DISPENSED
Start: 2021-11-09 | End: 2021-11-10

## 2021-11-09 RX ORDER — EPHEDRINE SULFATE 5 MG/ML
10 INJECTION INTRAVENOUS
Status: DISCONTINUED | OUTPATIENT
Start: 2021-11-09 | End: 2021-11-09 | Stop reason: HOSPADM

## 2021-11-09 RX ORDER — LIDOCAINE HYDROCHLORIDE AND EPINEPHRINE 15; 5 MG/ML; UG/ML
INJECTION, SOLUTION EPIDURAL AS NEEDED
Status: DISCONTINUED | OUTPATIENT
Start: 2021-11-09 | End: 2021-11-09 | Stop reason: SURG

## 2021-11-09 RX ORDER — MISOPROSTOL 200 UG/1
600 TABLET ORAL AS NEEDED
Status: DISCONTINUED | OUTPATIENT
Start: 2021-11-09 | End: 2021-11-11 | Stop reason: HOSPADM

## 2021-11-09 RX ORDER — ONDANSETRON 2 MG/ML
4 INJECTION INTRAMUSCULAR; INTRAVENOUS ONCE AS NEEDED
Status: DISCONTINUED | OUTPATIENT
Start: 2021-11-09 | End: 2021-11-09 | Stop reason: HOSPADM

## 2021-11-09 RX ORDER — BISACODYL 10 MG
10 SUPPOSITORY, RECTAL RECTAL DAILY PRN
Status: DISCONTINUED | OUTPATIENT
Start: 2021-11-10 | End: 2021-11-11 | Stop reason: HOSPADM

## 2021-11-09 RX ORDER — OXYTOCIN-SODIUM CHLORIDE 0.9% IV SOLN 30 UNIT/500ML 30-0.9/5 UT/ML-%
85 SOLUTION INTRAVENOUS ONCE
Status: COMPLETED | OUTPATIENT
Start: 2021-11-09 | End: 2021-11-09

## 2021-11-09 RX ORDER — METHYLERGONOVINE MALEATE 0.2 MG/ML
200 INJECTION INTRAVENOUS ONCE AS NEEDED
Status: DISCONTINUED | OUTPATIENT
Start: 2021-11-09 | End: 2021-11-09 | Stop reason: HOSPADM

## 2021-11-09 RX ORDER — DIPHENHYDRAMINE HYDROCHLORIDE 50 MG/ML
12.5 INJECTION INTRAMUSCULAR; INTRAVENOUS EVERY 8 HOURS PRN
Status: DISCONTINUED | OUTPATIENT
Start: 2021-11-09 | End: 2021-11-09 | Stop reason: HOSPADM

## 2021-11-09 RX ORDER — IBUPROFEN 600 MG/1
600 TABLET ORAL EVERY 6 HOURS PRN
Status: DISCONTINUED | OUTPATIENT
Start: 2021-11-09 | End: 2021-11-09 | Stop reason: HOSPADM

## 2021-11-09 RX ORDER — SODIUM CHLORIDE 0.9 % (FLUSH) 0.9 %
1-10 SYRINGE (ML) INJECTION AS NEEDED
Status: DISCONTINUED | OUTPATIENT
Start: 2021-11-09 | End: 2021-11-11 | Stop reason: HOSPADM

## 2021-11-09 RX ORDER — CARBOPROST TROMETHAMINE 250 UG/ML
250 INJECTION, SOLUTION INTRAMUSCULAR AS NEEDED
Status: DISCONTINUED | OUTPATIENT
Start: 2021-11-09 | End: 2021-11-09 | Stop reason: HOSPADM

## 2021-11-09 RX ADMIN — WITCH HAZEL 1 PAD: 500 SOLUTION RECTAL; TOPICAL at 15:08

## 2021-11-09 RX ADMIN — IBUPROFEN 600 MG: 600 TABLET, FILM COATED ORAL at 23:26

## 2021-11-09 RX ADMIN — BENZOCAINE AND LEVOMENTHOL: 200; 5 SPRAY TOPICAL at 15:08

## 2021-11-09 RX ADMIN — OXYTOCIN 85 ML/HR: 10 INJECTION INTRAVENOUS at 13:39

## 2021-11-09 RX ADMIN — SODIUM CHLORIDE, POTASSIUM CHLORIDE, SODIUM LACTATE AND CALCIUM CHLORIDE 125 ML/HR: 600; 310; 30; 20 INJECTION, SOLUTION INTRAVENOUS at 07:12

## 2021-11-09 RX ADMIN — LIDOCAINE HYDROCHLORIDE AND EPINEPHRINE 3 ML: 15; 5 INJECTION, SOLUTION EPIDURAL at 08:34

## 2021-11-09 RX ADMIN — BUTORPHANOL TARTRATE 2 MG: 2 INJECTION, SOLUTION INTRAMUSCULAR; INTRAVENOUS at 07:36

## 2021-11-09 RX ADMIN — OXYTOCIN 2 MILLI-UNITS/MIN: 10 INJECTION INTRAVENOUS at 05:31

## 2021-11-09 RX ADMIN — ROPIVACAINE HYDROCHLORIDE 15 ML/HR: 2 INJECTION, SOLUTION EPIDURAL; INFILTRATION at 08:44

## 2021-11-09 RX ADMIN — FENTANYL CITRATE 100 MCG: 50 INJECTION, SOLUTION INTRAMUSCULAR; INTRAVENOUS at 08:37

## 2021-11-09 RX ADMIN — FENTANYL CITRATE 100 MCG: 50 INJECTION, SOLUTION INTRAMUSCULAR; INTRAVENOUS at 08:41

## 2021-11-09 RX ADMIN — SODIUM CHLORIDE, POTASSIUM CHLORIDE, SODIUM LACTATE AND CALCIUM CHLORIDE 1000 ML/HR: 600; 310; 30; 20 INJECTION, SOLUTION INTRAVENOUS at 08:30

## 2021-11-09 RX ADMIN — DOCUSATE SODIUM 100 MG: 100 CAPSULE, LIQUID FILLED ORAL at 20:25

## 2021-11-09 RX ADMIN — ROPIVACAINE HYDROCHLORIDE 10 ML: 5 INJECTION, SOLUTION EPIDURAL; INFILTRATION; PERINEURAL at 08:41

## 2021-11-09 RX ADMIN — LIDOCAINE HYDROCHLORIDE AND EPINEPHRINE 2 ML: 15; 5 INJECTION, SOLUTION EPIDURAL at 08:37

## 2021-11-09 RX ADMIN — IBUPROFEN 600 MG: 600 TABLET ORAL at 13:36

## 2021-11-09 RX ADMIN — IBUPROFEN 600 MG: 600 TABLET, FILM COATED ORAL at 18:40

## 2021-11-09 RX ADMIN — SODIUM CHLORIDE, POTASSIUM CHLORIDE, SODIUM LACTATE AND CALCIUM CHLORIDE 125 ML/HR: 600; 310; 30; 20 INJECTION, SOLUTION INTRAVENOUS at 09:00

## 2021-11-09 NOTE — ANESTHESIA PREPROCEDURE EVALUATION
Anesthesia Evaluation     Patient summary reviewed and Nursing notes reviewed   NPO Solid Status: > 6 hours  NPO Liquid Status: < 2 hours           Airway   Mallampati: II  TM distance: >3 FB  Neck ROM: full  Dental      Pulmonary - negative pulmonary ROS   Cardiovascular - negative cardio ROS        Neuro/Psych  (+) seizures (childhood) well controlled, numbness (sciatica),     GI/Hepatic/Renal/Endo - negative ROS     Musculoskeletal (-) negative ROS    Abdominal    Substance History - negative use     OB/GYN    (+) Pregnant,         Other - negative ROS                       Anesthesia Plan    ASA 2     epidural       Anesthetic plan, all risks, benefits, and alternatives have been provided, discussed and informed consent has been obtained with: patient.    Plan discussed with attending.

## 2021-11-09 NOTE — ANESTHESIA PROCEDURE NOTES
Labor Epidural      Patient reassessed immediately prior to procedure    Patient location during procedure: floor  Performed By  CRNA: Jessica Berg CRNA  Preanesthetic Checklist  Completed: patient identified, IV checked, risks and benefits discussed, surgical consent, monitors and equipment checked, pre-op evaluation and timeout performed  Prep:  Pt Position:sitting  Sterile Tech:cap, gloves, mask and sterile barrier  Prep:DuraPrep  Monitoring:blood pressure monitoring  Epidural Block Procedure:  Approach:midline  Guidance:palpation technique  Location:L4-L5  Needle Type:Tuohy  Needle Gauge:17 G  Loss of Resistance Medium: air  Loss of Resistance: 4cm  Cath Depth at skin:10 cm  Paresthesia: none  Aspiration:negative  Test Dose:negative  Number of Attempts: 1  Post Assessment:  Dressing:occlusive dressing applied and secured with tape  Pt Tolerance:patient tolerated the procedure well with no apparent complications  Complications:no

## 2021-11-09 NOTE — PAYOR COMM NOTE
"Mady Joyce (31 y.o. Female)     [employer] ID#[id number]    Delivery information:  MOODY 21  GA 39 weeks and 5 days    Vaginal Delivery 21 @ 12:08  Wt 4060 gms  Male  Apgars 8/9    From: Jamee Barajas  #[phone]  Fax#[phone]              Date of Birth Social Security Number Address Home Phone MRN    1990  [address] [address] [phone] [id number]    [facility] Marital Status             None        Admission Date Admission Type Admitting Provider Attending Provider Department, Room/Bed    21 Elective Radha Serrano MD Carbajal, Tracey Owensby, MD [facility] MOTHER BABY 4B, N440/1    Discharge Date Discharge Disposition Discharge Destination                         Attending Provider: Radha Serrano MD    Allergies: No Known Allergies    Isolation: None   Infection: None   Code Status: Not on file   Advance Care Planning Activity    Ht: 172.7 cm (68\")   Wt: 78.9 kg (174 lb)    Admission Cmt: None   Principal Problem: None                Active Insurance as of 2021     Primary Coverage     Payor Plan Insurance Group Employer/Plan Group    [employer] [place] [employer]      Payor Plan Address Payor Plan Phone Number Payor Plan Fax Number Effective Dates    [address] [address] [phone]  3/5/2021 - None Entered    [facility] [id number]       Subscriber Name Subscriber Birth Date Member ID       MADY JOYCE 1990 [id number]                 Emergency Contacts      (Rel.) Home Phone Work Phone Mobile Phone    TORRI JOYCE (Spouse) [phone] -- --            Insurance Information                [facility]/[employer] MEDICAID Phone: [phone]    Subscriber: Mady Joyce Subscriber#: [id number]    Group#: -- Precert#: --          Problem List           Codes Noted - Resolved       Hospital    Pregnant ICD-10-CM: Z34.90  ICD-9-CM: V22.2 2021 - Present       Non-Hospital    " 38 weeks gestation of pregnancy ICD-10-CM: Z3A.38  ICD-9-CM: V22.2 2021 - Present    Sciatic pain, unspecified laterality ICD-10-CM: M54.30  ICD-9-CM: 724.3 2021 - Present    History of macrosomia in infant in prior pregnancy, currently pregnant in third trimester ICD-10-CM: O09.293  ICD-9-CM: V23.49 2021 - Present             History & Physical      Radha Serrano MD at 21 1900          [facility]  Obstetric History and Physical    Chief Complaint   Patient presents with   • Scheduled Induction       Subjective     Patient is a 31 y.o. female  currently at 39w5d, who presents for elective IOL.    Her prenatal care is benign.  Her previous obstetric/gynecological history is noted for is remarkable for previous  - 9#.    The following portions of the patients history were reviewed and updated as appropriate: current medications, allergies, past medical history, past surgical history, past family history, past social history and problem list .       Prenatal Information:  Prenatal Results     POC Urine Glucose/Protein     Test Value Reference Range Date Time    Urine Glucose  Negative mg/dL Negative, 1000 mg/dL (3+) 21 1024    Urine Protein  Negative mg/dL Negative 21 1024          Initial Prenatal Labs     Test Value Reference Range Date Time    Hemoglobin  10.4 g/dL 12.0 - 15.9 21       11.2 g/dL 12.0 - 15.9 21 1413       14.2 g/dL 11.1 - 15.9 21 0923    Hematocrit  32.9 % 34.0 - 46.6 21       34.0 % 34.0 - 46.6 21 1413       42.2 % 34.0 - 46.6 21 0923    Platelets  195 10*3/mm3 140 - 450 21 1746       187 10*3/mm3 140 - 450 21 1413       202 x10E3/uL 150 - 450 21 0923    Rubella IgG  4.47 index Immune >0.99 21 0923    Hepatitis B SAg  Negative  Negative 21 0923    Hepatitis C Ab  <0.1 s/co ratio 0.0 - 0.9 21    RPR  Non Reactive  Non Reactive 21    ABO  A    11/08/21 1900    Rh  Positive   11/08/21 1900    Antibody Screen  Negative   11/08/21 1746       Negative  Negative 08/23/21 1413       Negative  Negative 04/05/21 0923    HIV  Non Reactive  Non Reactive 04/05/21 0923    Urine Culture  Final report   04/05/21 0923    Gonorrhea  Negative  Negative 04/05/21 0923    Chlamydia  Negative  Negative 04/05/21 0923    TSH        HgB A1c               2nd and 3rd Trimester     Test Value Reference Range Date Time    Hemoglobin (repeated)  10.4 g/dL 12.0 - 15.9 11/08/21 1746       11.2 g/dL 12.0 - 15.9 08/23/21 1413    Hematocrit (repeated)  32.9 % 34.0 - 46.6 11/08/21 1746       34.0 % 34.0 - 46.6 08/23/21 1413    Platelets   195 10*3/mm3 140 - 450 11/08/21 1746       187 10*3/mm3 140 - 450 08/23/21 1413       202 x10E3/uL 150 - 450 04/05/21 0923    GCT  120 mg/dL 65 - 139 08/23/21 1413    Antibody Screen (repeated)  Negative   11/08/21 1746       Negative  Negative 08/23/21 1413    GTT Fasting        GTT 1 Hr        GTT 2 Hr        GTT 3 Hr        Group B Strep  No Group B Streptococcus isolated   10/25/21 1328          Drug Screening     Test Value Reference Range Date Time    Amphetamine Screen  Negative ng/mL Rjcxyo=9634 04/05/21 0923    Barbiturate Screen  Negative ng/mL Bpnxdk=503 04/05/21 0923    Benzodiazepine Screen  Negative ng/mL Fjpsry=391 04/05/21 0923    Methadone Screen  Negative ng/mL Zbbqti=751 04/05/21 0923    Phencyclidine Screen  Negative ng/mL Cutoff=25 04/05/21 0923    Opiates Screen  Negative ng/mL Evwyea=618 04/05/21 0923    THC Screen  Negative ng/mL Cutoff=20 04/05/21 0923    Cocaine Screen  Negative ng/mL Ylyedh=744 04/05/21 0923    Propoxyphene Screen  Negative ng/mL Alfqzp=035 04/05/21 0923    Buprenorphine Screen        Methamphetamine Screen        Oxycodone Screen        Tricyclic Antidepressants Screen              Other (Risk screening)     Test Value Reference Range Date Time    Varicella IgG        Parvovirus IgG        CMV IgG         Cystic Fibrosis        Hemoglobin electrophoresis        NIPT        MSAFP-4        AFP (for NTD only)              Legend    ^: Historical                      External Prenatal Results     Pregnancy Outside Results - Transcribed From Office Records - See Scanned Records For Details     Test Value Date Time    ABO  A  11/08/21 1900    Rh  Positive  11/08/21 1900    Antibody Screen  Negative  11/08/21 1746       Negative  08/23/21 1413       Negative  04/05/21 0923    Varicella IgG       Rubella  4.47 index 04/05/21 0923    Hgb  10.4 g/dL 11/08/21 1746       11.2 g/dL 08/23/21 1413       14.2 g/dL 04/05/21 0923    Hct  32.9 % 11/08/21 1746       34.0 % 08/23/21 1413       42.2 % 04/05/21 0923    Glucose Fasting GTT       Glucose Tolerance Test 1 hour       Glucose Tolerance Test 3 hour       Gonorrhea (discrete)  Negative  04/05/21 0923    Chlamydia (discrete)  Negative  04/05/21 0923    RPR  Non Reactive  04/05/21 0923    VDRL       Syphilis Antibody       HBsAg  Negative  04/05/21 0923    Herpes Simplex Virus PCR       Herpes Simplex VIrus Culture       HIV  Non Reactive  04/05/21 0923    Hep C RNA Quant PCR       Hep C Antibody  <0.1 s/co ratio 04/05/21 0923    AFP       Group B Strep  No Group B Streptococcus isolated  10/25/21 1328    GBS Susceptibility to Clindamycin       GBS Susceptibility to Erythromycin       Fetal Fibronectin       Genetic Testing, Maternal Blood             Drug Screening     Test Value Date Time    Urine Drug Screen       Amphetamine Screen  Negative ng/mL 04/05/21 0923    Barbiturate Screen  Negative ng/mL 04/05/21 0923    Benzodiazepine Screen  Negative ng/mL 04/05/21 0923    Methadone Screen  Negative ng/mL 04/05/21 0923    Phencyclidine Screen  Negative ng/mL 04/05/21 0923    Opiates Screen       THC Screen       Cocaine Screen       Propoxyphene Screen  Negative ng/mL 04/05/21 0923    Buprenorphine Screen       Methamphetamine Screen       Oxycodone Screen       Tricyclic  Antidepressants Screen             Legend    ^: Historical                         Past OB History:     OB History    Para Term  AB Living   2 1 1 0 0 1   SAB IAB Ectopic Molar Multiple Live Births   0 0 0 0 0 1      # Outcome Date GA Lbr Roberto/2nd Weight Sex Delivery Anes PTL Lv   2 Current            1 Term 20 40w1d  4082 g (9 lb) M Vag-Spont EPI  LAI       Past Medical History: Past Medical History:   Diagnosis Date   • Ovarian cyst    • Seizures (HCC)     in childhood, outgrew them per patient       Past Surgical History Past Surgical History:   Procedure Laterality Date   • WISDOM TOOTH EXTRACTION        Family History: Family History   Problem Relation Age of Onset   • Stroke Maternal Grandfather       Social History:  reports that she has never smoked. She has never used smokeless tobacco.   reports no history of alcohol use.   reports no history of drug use.        Review of Systems      Objective     Vital Signs Range for the last 24 hours  Temperature: Temp:  [97.8 °F (36.6 °C)-99.1 °F (37.3 °C)] 99.1 °F (37.3 °C)   Temp Source: Temp src: Oral   BP: BP: ()/(50-93) 121/60   Pulse: Heart Rate:  [] 75   Respirations: Resp:  [12-16] 12   SPO2:     O2 Amount (l/min):     O2 Devices     Weight: Weight:  [78.9 kg (174 lb)] 78.9 kg (174 lb)     Physical Examination: General appearance - alert, well appearing, and in no distress  Neck - supple, no significant adenopathy  Abdomen - soft, nontender, nondistended, no masses or organomegaly  Pelvic - normal external genitalia, vulva, vagina, cervix, uterus and adnexa  Musculoskeletal - no joint tenderness, deformity or swelling  Extremities - peripheral pulses normal, no pedal edema, no clubbing or cyanosis  Skin - normal coloration and turgor, no rashes, no suspicious skin lesions noted    Presentation: vertex   Cervix: Exam by:  MD   Dilation:  3   Effacement:  70   Station:  -2     Fetal Heart Rate Assessment   Category 1                               Uterine Assessment   infrequent                                      Laboratory Results: GBS neg    Assessment/Plan       Pregnant      Assessment & Plan    Assessment:  1.  Intrauterine pregnancy at 39w5d weeks gestation with reassuring fetal status.    2.  induction of labor  for elective  with favorable cervix  3.  Obstetrical history significant for is remarkable for previous  9#.    Plan:  1. fetal and uterine monitoring  continuously, cervical ripening with  intra-uterine freeman catheter, labor augmentation  Pitocin and analgesia with  epidural  2. Plan of care has been reviewed with patient and FOB  3.  Risks, benefits of treatment plan have been discussed.  4.  All questions have been answered.      Radha Serrano MD  2021  13:37 EST    Electronically signed by Radha Serrano MD at 21 1340         Facility-Administered Medications as of 2021   Medication Dose Route Frequency Provider Last Rate Last Admin   • ePHEDrine Sulfate 5 MG/ML injection  - ADS Override Pull            • ibuprofen (ADVIL,MOTRIN) tablet 600 mg  600 mg Oral Q6H Radha Serrano MD       • lactated ringers infusion  125 mL/hr Intravenous Continuous Radha Serrano  mL/hr at 21 0900 125 mL/hr at 21 0900   • mineral oil liquid  - ADS Override Pull            • [COMPLETED] oxytocin in sodium chloride (PITOCIN) 30 UNIT/500ML infusion solution  85 mL/hr Intravenous Once Radha Serrano MD 85 mL/hr at 21 1339 85 mL/hr at 21 1339   • ropivacaine (NAROPIN) 0.2 % injection  15 mL/hr Epidural Continuous Jessica Berg CRNA   Stopped at 21 1224       Orders (last 48 hrs)      Start     Ordered    21 1430  ibuprofen (ADVIL,MOTRIN) tablet 600 mg  Every 6 Hours Scheduled         21 1333    21 1315  oxytocin in sodium chloride (PITOCIN) 30 UNIT/500ML infusion solution  Once,   Status:  Discontinued       "  \"Followed by\" Linked Group Details    11/09/21 1215    11/09/21 1315  oxytocin in sodium chloride (PITOCIN) 30 UNIT/500ML infusion solution  Once        \"Followed by\" Linked Group Details    11/09/21 1215    11/09/21 1257  VTE Prophylaxis Not Indicated: Recent Trauma and/or Surgery (2); </= 3 (Low Risk)  Once         11/09/21 1257    11/09/21 1216  Notify Physician (specified)  Until Discontinued         11/09/21 1215    11/09/21 1216  Vital Signs Per Hospital Policy  Per Hospital Policy         11/09/21 1215    11/09/21 1216  Up Ad Kimberlee  Until Discontinued         11/09/21 1215    11/09/21 1216  Fundal & Lochia Check  Per Order Details        Comments: Every 15 Minutes x4, Then Every 30 Minutes x2, Then Every Shift    11/09/21 1215    11/09/21 1216  Diet Regular  Diet Effective Now         11/09/21 1215    11/09/21 1216  Advance Diet as Tolerated  Until Discontinued         11/09/21 1215    11/09/21 1216  Nurse May Remove Epidural Catheter After Delivery  Continuous         11/09/21 1215    11/09/21 1216  Transfer to postpartum when discharge criteria met.  Until Discontinued         11/09/21 1215    11/09/21 1215  Strict Intake and Output  Every Shift       11/09/21 1215    11/09/21 1215  Fundal & Lochia Check  Every Shift       11/09/21 1215    11/09/21 1215  acetaminophen (TYLENOL) tablet 650 mg  Every 4 Hours PRN,   Status:  Discontinued         11/09/21 1215    11/09/21 1215  ibuprofen (ADVIL,MOTRIN) tablet 600 mg  Every 6 Hours PRN,   Status:  Discontinued         11/09/21 1215    11/09/21 1215  methylergonovine (METHERGINE) injection 200 mcg  Once As Needed,   Status:  Discontinued         11/09/21 1215    11/09/21 1215  carboprost (HEMABATE) injection 250 mcg  As Needed,   Status:  Discontinued         11/09/21 1215    11/09/21 1215  miSOPROStol (CYTOTEC) tablet 800 mcg  As Needed,   Status:  Discontinued         11/09/21 1215    11/09/21 0915  ropivacaine (NAROPIN) 0.2 % injection  Continuous         " 11/09/21 0820 11/09/21 0915  Sod Citrate-Citric Acid (BICITRA) solution 30 mL  Once,   Status:  Discontinued         11/09/21 0820 11/09/21 0821  Vital Signs Per Anesthesia Guidelines  Per Hospital Policy        Comments: Every 3 Minutes x 20 Minutes following epidural dosing, then if stable every 15 Minutes    11/09/21 0820 11/09/21 0821  Start IV (16 or 18 Gauge)  Once         11/09/21 0820 11/09/21 0821  Fetal Heart Rate Monitor  Once         11/09/21 0820 11/09/21 0821  Nurse or Anesthesiologist to Remain With Patient for 15 Minutes Following Dosing  Continuous         11/09/21 0820 11/09/21 0821  Facilitate Maternal Position on Side & Maintain Uterine Displacement  Continuous         11/09/21 0820 11/09/21 0821  Consult Anesthesia Prior to Changing Epidural Infusion / Rate  Continuous         11/09/21 0820 11/09/21 0820  ondansetron (ZOFRAN) injection 4 mg  Once As Needed,   Status:  Discontinued         11/09/21 0820 11/09/21 0820  famotidine (PEPCID) injection 20 mg  Once As Needed,   Status:  Discontinued         11/09/21 0820 11/09/21 0820  diphenhydrAMINE (BENADRYL) injection 12.5 mg  Every 8 Hours PRN,   Status:  Discontinued         11/09/21 0820 11/09/21 0820  lactated ringers bolus 1,000 mL  As Needed,   Status:  Discontinued         11/09/21 0820 11/09/21 0820  ePHEDrine Sulfate 5 MG/ML injection 10 mg  Every 10 Minutes PRN,   Status:  Discontinued         11/09/21 0820 11/09/21 0820  metoclopramide (REGLAN) injection 10 mg  Once As Needed,   Status:  Discontinued         11/09/21 0820 11/09/21 0818  ePHEDrine Sulfate 5 MG/ML injection  - ADS Override Pull        Note to Pharmacy: Created by cabinet override    11/09/21 0818 11/09/21 0500  oxytocin in sodium chloride (PITOCIN) 30 UNIT/500ML infusion solution  Titrated,   Status:  Discontinued         11/08/21 1744    11/09/21 0300  oxytocin in sodium chloride (PITOCIN) 30 UNIT/500ML infusion solution   Titrated,   Status:  Discontinued         11/08/21 1726    11/08/21 2100  sodium chloride 0.9 % flush 3 mL  Every 12 Hours Scheduled,   Status:  Discontinued         11/08/21 1726    11/08/21 1815  lactated ringers infusion  Continuous         11/08/21 1726    11/08/21 1815  mineral oil liquid 30 mL  Once,   Status:  Discontinued         11/08/21 1726    11/08/21 1808  ABO RH Specimen Verification  STAT         11/08/21 1807    11/08/21 1807  Diet Regular  Diet Effective Now,   Status:  Canceled         11/08/21 1806    11/08/21 1758  DIET MESSAGE Chicken tenders and fries, fruit  Once        Comments: Chicken tenders and fries, fruit    11/08/21 1757    11/08/21 1726  COVID PRE-OP / PRE-PROCEDURE SCREENING ORDER (NO ISOLATION) - Swab, Nasopharynx  Once         11/08/21 1725    11/08/21 1726  COVID-19, ABBOTT IN-HOUSE,NASAL Swab (NO TRANSPORT MEDIA) 2 HR TAT - Swab, Nasopharynx  PROCEDURE ONCE         11/08/21 1725    11/08/21 1725  Admit To Obstetrics Inpatient  Once         11/08/21 1726    11/08/21 1725  Obtain informed consent  Once         11/08/21 1726    11/08/21 1725  VTE Prophylaxis Not Indicated: Recent Trauma and/or Surgery (2); </= 3 (Low Risk)  Once         11/08/21 1726    11/08/21 1725  Vital Signs Per Hospital Policy  Per Hospital Policy         11/08/21 1726    11/08/21 1725  Mini- prep prior to delivery  Once         11/08/21 1726    11/08/21 1725  Continuous Fetal Monitoring With NST on Admission and Prior to Initiation of Oxytocin.  Per Order Details        Comments: Continuous Fetal Monitoring With NST on Admission & Prior to Initiation of Oxytocin.    11/08/21 1726    11/08/21 1725  External Uterine Contraction Monitoring  Per Hospital Policy         11/08/21 1726    11/08/21 1725  Notify Physician (specified)  Until Discontinued         11/08/21 1726    11/08/21 1725  Notify physician for tachysystole (per hospital algorithm)  Until Discontinued         11/08/21 1726    11/08/21 1725  Notify  physician if membranes ruptured, bleeding greater than 1 pad an hour, fetal heart tone abnormality, and severe pain  Until Discontinued         21 1726    21 172  Initiate Group Beta Strep (GBS) Prophylaxis Protocol, If Criteria Met  Continuous        Comments: NO TREATMENT RECOMMENDED IF: 1)  Maternal GBS status known negative 2)  Scheduled  birth with intact membranes, not in labor.  3 ) Maternal GBS unknown, no risk factors.   TREAT WITH ANTIBIOTICS IF:  1)  Maternal GBS status is known postive.  2)  Maternal GBS status unknown with these risk factors:  a)  Previous infant affected by GBS infection.  b)  GBS urinary tract infection (UTI) or bacteruria during pregnancy  c)  Unexplained maternal fever in labor (greater than or equal to 100.4F or 38.0C)  d)  Prolonged rupture of the membranes greater than or equal to 18 hours.  e)  Gestational age less than 37 weeks.    21 1726    21 172  Diet Clear Liquid  Diet Effective Now,   Status:  Canceled        Comments: Patient may have clears until active labor and while FHT's Category 1 tracing    21 1726    21 172  CBC (No Diff)  Once         21 1726    21 1725  Type & Screen  Once         21 1726    21 172  Preeclampsia Panel  Once        Comments: If induction for gestational hypertension, pre-eclampsia, chronic hypertension or if BP > 140/90      21 1726    21 172  Insert Peripheral IV  Once         21 1726    21 172  Saline Lock & Maintain IV Access  Continuous         21 1726    21 172  sodium chloride 0.9 % flush 3-10 mL  As Needed,   Status:  Discontinued         21 1726    21 172  lactated ringers bolus 1,000 mL  Once As Needed,   Status:  Discontinued         21 1726    21 172  butorphanol (STADOL) injection 1 mg  Every 2 Hours PRN,   Status:  Discontinued         21 1726    21 172  butorphanol (STADOL) injection  "2 mg  Every 2 Hours PRN,   Status:  Discontinued         11/08/21 1726    11/08/21 1724  ondansetron (ZOFRAN) tablet 4 mg  Every 6 Hours PRN,   Status:  Discontinued        \"Or\" Linked Group Details    11/08/21 1726    11/08/21 1724  ondansetron (ZOFRAN) injection 4 mg  Every 6 Hours PRN,   Status:  Discontinued        \"Or\" Linked Group Details    11/08/21 1726    11/08/21 1724  lidocaine PF 1% (XYLOCAINE) injection 5 mL  As Needed,   Status:  Discontinued         11/08/21 1726    11/08/21 1702  erythromycin (ROMYCIN) 5 MG/GM ophthalmic ointment  - ADS Override Pull  Status:  Discontinued        Note to Pharmacy: Created by cabinet override    11/08/21 1702    11/08/21 1701  mineral oil liquid  - ADS Override Pull        Note to Pharmacy: Created by cabinet override    11/08/21 1701    Unscheduled  Position Change - For Intra-Uterine Resusitation for Hypertonus, HyperStimulation or Non-Reassuring Fetal Status  As Needed       11/08/21 1726    Unscheduled  Apply Ice to Perineum  As Needed       11/09/21 1215    Unscheduled  Bladder Assessment  As Needed       11/09/21 1215    Signed and Held  Mancia Bulb Cervical Ripening  Once         Signed and Held    Signed and Held  Code Status and Medical Interventions:  Continuous         Signed and Held    Signed and Held  Vital Signs Per hospital policy  Per Hospital Policy         Signed and Held    Signed and Held  Notify Physician  Until Discontinued         Signed and Held    Signed and Held  Up Ad Kimberlee  Until Discontinued         Signed and Held    Signed and Held  Ambulate Patient  Every Shift       Signed and Held    Signed and Held  Diet Regular  Diet Effective Now         Signed and Held    Signed and Held  Advance Diet as Tolerated  Until Discontinued         Signed and Held    Signed and Held  Fundal and Lochia Check  Per Hospital Policy        Comments: Q 15 min x 4, Q 30 min x 2, then Q Shift    Signed and Held    Signed and Held  RN to Assess Rh Status & Place " RhIG Evaluation Order if Indicated  Continuous         Signed and Held    Signed and Held  Bladder Assessment  Per Order Details        Comments: Postpartum 1) Upon Admission to Unit & Every 4 Hours PRN Until Voiding. 2) Out of Bed to Void in 8 Hours.    Signed and Held    Signed and Held  Straight Cath  Per Order Details        Comments: Postpartum: If Distended & Unable to Void, May Repeat Once.    Signed and Held    Signed and Held  Indwelling Urinary Catheter  Per Order Details        Comments: Postpartum : After Straight Cathed x2 or if Greater Than 1000mL Residual, Insert Indwelling Urinary Catheter Until Further MD Order.    Signed and Held    Signed and Held  Remove Vaginal Packing  Once         Signed and Held    Signed and Held  Apply Ice to Perineum  As Needed        Comments: For 20 min q 2 hrs    Signed and Held    Signed and Held  Waffle Cushion  As Needed        Comments: For perineal discomfort    Signed and Held    Signed and Held  Donut Ring  As Needed        Comments: For perineal pain    Signed and Held    Signed and Held  Sitz Bath  3 Times Daily        Comments: PRN    Signed and Held    Signed and Held  Kpad  As Needed        Comments: For pain    Signed and Held    Signed and Held  Warm compress  As Needed         Signed and Held    Signed and Held  Breast pump to bed  Once         Signed and Held    Signed and Held  Apply ace wrap, tight bra, or binder  As Needed         Signed and Held    Signed and Held  Apply ice packs  As Needed         Signed and Held    Signed and Held  If indicated -- Please administer RH Immunoglobulin based on results of cord blood evaluation and fetal screen lab tests, pharmacy to dispense  Continuous        Comments: See process instructions for reference range details.    Signed and Held    Signed and Held  CBC & Differential  Timed        Comments: Postpartum Day 1      Signed and Held    Signed and Held  Hemoglobin & Hematocrit, Blood  Timed        Comments:  "Postpartum Day 1      Signed and Held    Signed and Held  sodium chloride 0.9 % flush 1-10 mL  As Needed         Signed and Held    Signed and Held  miSOPROStol (CYTOTEC) tablet 600 mcg  As Needed         Signed and Held    Signed and Held  bisacodyl (DULCOLAX) suppository 10 mg  Daily PRN         Signed and Held    Signed and Held  magnesium hydroxide (MILK OF MAGNESIA) suspension 10 mL  Daily PRN         Signed and Held    Signed and Held  docusate sodium (COLACE) capsule 100 mg  2 Times Daily         Signed and Held    Signed and Held  lanolin cream  Every 1 Hour PRN         Signed and Held    Signed and Held  benzocaine-menthol (DERMOPLAST) 20-0.5 % topical spray  As Needed         Signed and Held    Signed and Held  witch hazel-glycerin (TUCKS) pad 1 pad  As Needed         Signed and Held    Signed and Held  Hydrocortisone (Perianal) (ANUSOL-HC) 2.5 % rectal cream 1 application  As Needed         Signed and Held    Signed and Held  benzocaine (AMERICAINE) 20 % rectal ointment 1 application  As Needed         Signed and Held                 Operative/Procedure Notes (last 48 hours)      Stoney London III, MD at 11/08/21 1943      Pre-procedure Diagnoses    1. Unfavorable cervix in term pregnancy [O34.40]           Post-procedure Diagnoses    1. Unfavorable cervix in term pregnancy [O34.40]           Procedures    1. INSERTION OF CERVICAL DILATOR [OBO3 (Custom)]             Patient admitted for elective induction of labor at 39 weeks 4 days gestation.  Request received for placement of transcervical Mancia bulb for cervical ripening.  Cervical exam: 2 cm / 60%/-3 station (cervix posterior and medium texture).  Cephalic presentation confirmed by bedside ultrasound.  Transcervical Mancia placed per physician request.  FHT's class 1.  Patient tolerated well.  24 french, 60ml.    Stoney Pierce \"[place]\" FRANNIE London MD  11/8/2021  19:44 EST          Electronically signed by Stoney London III, MD at 11/08/21 1944   "   Radha Serrano MD at 21 1340            2021    Patient:Mady Brown    MR#:[id number]    Vaginal Delivery Note  31 y.o. yo female  at 39w5d    Patient Active Problem List   Diagnosis   • History of macrosomia in infant in prior pregnancy, currently pregnant in third trimester   • Sciatic pain, unspecified laterality   • 38 weeks gestation of pregnancy   • Pregnant       Delivery     Delivery: Vaginal, Spontaneous     YOB: 2019    Time of Birth: 12:08 PM      Anesthesia: Epidural     Delivering clinician: Radha Serrano    Forceps?   No   Vacuum? No    Shoulder dystocia present: No          Infant    Findings: male  infant     Infant observations: Weight: 4060 g (8 lb 15.2 oz)     Observations/Comments:        Apgars: 8  @ 1 minute /    9  @ 5 minutes         Placenta, Cord, and Fluid    Placenta delivered  Spontaneous  at  2021 12:18 PM     Cord: 3 vessels  present.   Nuchal Cord?  no   Cord blood obtained: Yes    Cord gases obtained:  No    Cord gas results: Pending         Repair    Episiotomy: No   Lacerations: Yes  Laceration Information  Laceration Repaired?   Perineal: 2nd  Yes    Periurethral:       Labial:       Sulcus:       Vaginal:       Cervical:         Suture used for repair: 2-0 Vicryl, rapide   Estimated Blood Loss:  200 mls.         Complications  none    Disposition  Mother to Mother Baby/Postpartum  in stable condition currently.  Baby to remains with mom  in stable condition currently.    Description of Delivery  Patient pushed well x 1 contraction and delivered easily from OA presentation.   Shoulders delivered easily. Nose and mouth was bulb suctioned and baby was placed on mother's abdomen.  Cord was milked,  clamped and cut.  2nd degree Repair was done next.  Placenta delivered easily and with pitocin bolus, there was no uterine atony.       Radha Serrano MD  21  13:40 EST            Electronically signed by Zach  Radha Garcia MD at 11/09/21 1341         Physician Progress Notes (last 48 hours)  Notes from 11/07/21 1359 through 11/09/21 1359   No notes of this type exist for this encounter.

## 2021-11-09 NOTE — L&D DELIVERY NOTE
2021    Patient:Mady Brown    MR#:[id number]    Vaginal Delivery Note  31 y.o. yo female  at 39w5d    Patient Active Problem List   Diagnosis   • History of macrosomia in infant in prior pregnancy, currently pregnant in third trimester   • Sciatic pain, unspecified laterality   • 38 weeks gestation of pregnancy   • Pregnant       Delivery     Delivery: Vaginal, Spontaneous     YOB: 2019    Time of Birth: 12:08 PM      Anesthesia: Epidural     Delivering clinician: Radha Serrano    Forceps?   No   Vacuum? No    Shoulder dystocia present: No          Infant    Findings: male  infant     Infant observations: Weight: 4060 g (8 lb 15.2 oz)     Observations/Comments:        Apgars: 8  @ 1 minute /    9  @ 5 minutes         Placenta, Cord, and Fluid    Placenta delivered  Spontaneous  at  2021 12:18 PM     Cord: 3 vessels  present.   Nuchal Cord?  no   Cord blood obtained: Yes    Cord gases obtained:  No    Cord gas results: Pending         Repair    Episiotomy: No   Lacerations: Yes  Laceration Information  Laceration Repaired?   Perineal: 2nd  Yes    Periurethral:       Labial:       Sulcus:       Vaginal:       Cervical:         Suture used for repair: 2-0 Vicryl, rapide   Estimated Blood Loss:  200 mls.         Complications  none    Disposition  Mother to Mother Baby/Postpartum  in stable condition currently.  Baby to remains with mom  in stable condition currently.    Description of Delivery  Patient pushed well x 1 contraction and delivered easily from OA presentation.   Shoulders delivered easily. Nose and mouth was bulb suctioned and baby was placed on mother's abdomen.  Cord was milked,  clamped and cut.  2nd degree Repair was done next.  Placenta delivered easily and with pitocin bolus, there was no uterine atony.       Radha Serrano MD  21  13:40 EST

## 2021-11-09 NOTE — PROCEDURES
"Patient admitted for elective induction of labor at 39 weeks 4 days gestation.  Request received for placement of transcervical Mancia bulb for cervical ripening.  Cervical exam: 2 cm / 60%/-3 station (cervix posterior and medium texture).  Cephalic presentation confirmed by bedside ultrasound.  Transcervical Mancia placed per physician request.  FHT's class 1.  Patient tolerated well.  24 [place], 60ml.    Stoney [place] \"[place]\" FRANNIE London MD  11/8/2021  19:44 EST        "

## 2021-11-09 NOTE — H&P
ANGEL Mullen  Obstetric History and Physical    Chief Complaint   Patient presents with   • Scheduled Induction       Subjective     Patient is a 31 y.o. female  currently at 39w5d, who presents for elective IOL.    Her prenatal care is benign.  Her previous obstetric/gynecological history is noted for is remarkable for previous  - 9#.    The following portions of the patients history were reviewed and updated as appropriate: current medications, allergies, past medical history, past surgical history, past family history, past social history and problem list .       Prenatal Information:  Prenatal Results     POC Urine Glucose/Protein     Test Value Reference Range Date Time    Urine Glucose  Negative mg/dL Negative, 1000 mg/dL (3+) 21 1024    Urine Protein  Negative mg/dL Negative 21 1024          Initial Prenatal Labs     Test Value Reference Range Date Time    Hemoglobin  10.4 g/dL 12.0 - 15.9 21 1746       11.2 g/dL 12.0 - 15.9 21 1413       14.2 g/dL 11.1 - 15.9 21 0923    Hematocrit  32.9 % 34.0 - 46.6 21 1746       34.0 % 34.0 - 46.6 21 1413       42.2 % 34.0 - 46.6 21 0923    Platelets  195 10*3/mm3 140 - 450 21 1746       187 10*3/mm3 140 - 450 21 1413       202 x10E3/uL 150 - 450 21 0923    Rubella IgG  4.47 index Immune >0.99 21 0923    Hepatitis B SAg  Negative  Negative 21 0923    Hepatitis C Ab  <0.1 s/co ratio 0.0 - 0.9 21 0923    RPR  Non Reactive  Non Reactive 21 0923    ABO  A   21 1900    Rh  Positive   21 1900    Antibody Screen  Negative   21 1746       Negative  Negative 21 1413       Negative  Negative 21 0923    HIV  Non Reactive  Non Reactive 21 0923    Urine Culture  Final report   21 0923    Gonorrhea  Negative  Negative 21 0923    Chlamydia  Negative  Negative 21 0923    TSH        HgB A1c               2nd and 3rd Trimester     Test  Value Reference Range Date Time    Hemoglobin (repeated)  10.4 g/dL 12.0 - 15.9 11/08/21 1746       11.2 g/dL 12.0 - 15.9 08/23/21 1413    Hematocrit (repeated)  32.9 % 34.0 - 46.6 11/08/21 1746       34.0 % 34.0 - 46.6 08/23/21 1413    Platelets   195 10*3/mm3 140 - 450 11/08/21 1746       187 10*3/mm3 140 - 450 08/23/21 1413       202 x10E3/uL 150 - 450 04/05/21 0923    GCT  120 mg/dL 65 - 139 08/23/21 1413    Antibody Screen (repeated)  Negative   11/08/21 1746       Negative  Negative 08/23/21 1413    GTT Fasting        GTT 1 Hr        GTT 2 Hr        GTT 3 Hr        Group B Strep  No Group B Streptococcus isolated   10/25/21 1328          Drug Screening     Test Value Reference Range Date Time    Amphetamine Screen  Negative ng/mL Xqygqs=3169 04/05/21 0923    Barbiturate Screen  Negative ng/mL Kscfco=022 04/05/21 0923    Benzodiazepine Screen  Negative ng/mL Sxhlgm=751 04/05/21 0923    Methadone Screen  Negative ng/mL Hmthkk=082 04/05/21 0923    Phencyclidine Screen  Negative ng/mL Cutoff=25 04/05/21 0923    Opiates Screen  Negative ng/mL Dakxqj=331 04/05/21 0923    THC Screen  Negative ng/mL Cutoff=20 04/05/21 0923    Cocaine Screen  Negative ng/mL Uwlujl=275 04/05/21 0923    Propoxyphene Screen  Negative ng/mL Uyonvq=118 04/05/21 0923    Buprenorphine Screen        Methamphetamine Screen        Oxycodone Screen        Tricyclic Antidepressants Screen              Other (Risk screening)     Test Value Reference Range Date Time    Varicella IgG        Parvovirus IgG        CMV IgG        Cystic Fibrosis        Hemoglobin electrophoresis        NIPT        MSAFP-4        AFP (for NTD only)              Legend    ^: Historical                      External Prenatal Results     Pregnancy Outside Results - Transcribed From Office Records - See Scanned Records For Details     Test Value Date Time    ABO  A  11/08/21 1900    Rh  Positive  11/08/21 1900    Antibody Screen  Negative  11/08/21 1746       Negative   21 1413       Negative  21 0923    Varicella IgG       Rubella  4.47 index 21 09    Hgb  10.4 g/dL 21 1746       11.2 g/dL 21 1413       14.2 g/dL 21 09    Hct  32.9 % 21 1746       34.0 % 21 1413       42.2 % 21 09    Glucose Fasting GTT       Glucose Tolerance Test 1 hour       Glucose Tolerance Test 3 hour       Gonorrhea (discrete)  Negative  21 09    Chlamydia (discrete)  Negative  21    RPR  Non Reactive  21    VDRL       Syphilis Antibody       HBsAg  Negative  21    Herpes Simplex Virus PCR       Herpes Simplex VIrus Culture       HIV  Non Reactive  21    Hep C RNA Quant PCR       Hep C Antibody  <0.1 s/co ratio 21 09    AFP       Group B Strep  No Group B Streptococcus isolated  10/25/21 1328    GBS Susceptibility to Clindamycin       GBS Susceptibility to Erythromycin       Fetal Fibronectin       Genetic Testing, Maternal Blood             Drug Screening     Test Value Date Time    Urine Drug Screen       Amphetamine Screen  Negative ng/mL 21 0923    Barbiturate Screen  Negative ng/mL 21 09    Benzodiazepine Screen  Negative ng/mL 21 09    Methadone Screen  Negative ng/mL 21 09    Phencyclidine Screen  Negative ng/mL 21    Opiates Screen       THC Screen       Cocaine Screen       Propoxyphene Screen  Negative ng/mL 21 09    Buprenorphine Screen       Methamphetamine Screen       Oxycodone Screen       Tricyclic Antidepressants Screen             Legend    ^: Historical                         Past OB History:     OB History    Para Term  AB Living   2 1 1 0 0 1   SAB IAB Ectopic Molar Multiple Live Births   0 0 0 0 0 1      # Outcome Date GA Lbr Roberto/2nd Weight Sex Delivery Anes PTL Lv   2 Current            1 Term 20 40w1d  4082 g (9 lb) M Vag-Spont EPI  LAI       Past Medical History: Past Medical History:   Diagnosis  Date   • Ovarian cyst    • Seizures (HCC)     in childhood, outgrew them per patient       Past Surgical History Past Surgical History:   Procedure Laterality Date   • WISDOM TOOTH EXTRACTION        Family History: Family History   Problem Relation Age of Onset   • Stroke Maternal Grandfather       Social History:  reports that she has never smoked. She has never used smokeless tobacco.   reports no history of alcohol use.   reports no history of drug use.        Review of Systems      Objective     Vital Signs Range for the last 24 hours  Temperature: Temp:  [97.8 °F (36.6 °C)-99.1 °F (37.3 °C)] 99.1 °F (37.3 °C)   Temp Source: Temp src: Oral   BP: BP: ()/(50-93) 121/60   Pulse: Heart Rate:  [] 75   Respirations: Resp:  [12-16] 12   SPO2:     O2 Amount (l/min):     O2 Devices     Weight: Weight:  [78.9 kg (174 lb)] 78.9 kg (174 lb)     Physical Examination: General appearance - alert, well appearing, and in no distress  Neck - supple, no significant adenopathy  Abdomen - soft, nontender, nondistended, no masses or organomegaly  Pelvic - normal external genitalia, vulva, vagina, cervix, uterus and adnexa  Musculoskeletal - no joint tenderness, deformity or swelling  Extremities - peripheral pulses normal, no pedal edema, no clubbing or cyanosis  Skin - normal coloration and turgor, no rashes, no suspicious skin lesions noted    Presentation: vertex   Cervix: Exam by:  MD   Dilation:  3   Effacement:  70   Station:  -2     Fetal Heart Rate Assessment   Category 1                              Uterine Assessment   infrequent                                      Laboratory Results: GBS neg    Assessment/Plan       Pregnant      Assessment & Plan    Assessment:  1.  Intrauterine pregnancy at 39w5d weeks gestation with reassuring fetal status.    2.  induction of labor  for elective  with favorable cervix  3.  Obstetrical history significant for is remarkable for previous  9#.    Plan:  1. fetal and uterine  monitoring  continuously, cervical ripening with  intra-uterine freeman catheter, labor augmentation  Pitocin and analgesia with  epidural  2. Plan of care has been reviewed with patient and FOB  3.  Risks, benefits of treatment plan have been discussed.  4.  All questions have been answered.      Radha Serrano MD  11/9/2021  13:37 EST

## 2021-11-10 LAB
BASOPHILS # BLD AUTO: 0.04 10*3/MM3 (ref 0–0.2)
BASOPHILS NFR BLD AUTO: 0.5 % (ref 0–1.5)
DEPRECATED RDW RBC AUTO: 46.4 FL (ref 37–54)
EOSINOPHIL # BLD AUTO: 0.15 10*3/MM3 (ref 0–0.4)
EOSINOPHIL NFR BLD AUTO: 1.7 % (ref 0.3–6.2)
ERYTHROCYTE [DISTWIDTH] IN BLOOD BY AUTOMATED COUNT: 14.6 % (ref 12.3–15.4)
HCT VFR BLD AUTO: 32.9 % (ref 34–46.6)
HGB BLD-MCNC: 10.1 G/DL (ref 12–15.9)
IMM GRANULOCYTES # BLD AUTO: 0.03 10*3/MM3 (ref 0–0.05)
IMM GRANULOCYTES NFR BLD AUTO: 0.3 % (ref 0–0.5)
LYMPHOCYTES # BLD AUTO: 0.97 10*3/MM3 (ref 0.7–3.1)
LYMPHOCYTES NFR BLD AUTO: 11.2 % (ref 19.6–45.3)
MCH RBC QN AUTO: 27.2 PG (ref 26.6–33)
MCHC RBC AUTO-ENTMCNC: 30.7 G/DL (ref 31.5–35.7)
MCV RBC AUTO: 88.4 FL (ref 79–97)
MONOCYTES # BLD AUTO: 0.61 10*3/MM3 (ref 0.1–0.9)
MONOCYTES NFR BLD AUTO: 7 % (ref 5–12)
NEUTROPHILS NFR BLD AUTO: 6.87 10*3/MM3 (ref 1.7–7)
NEUTROPHILS NFR BLD AUTO: 79.3 % (ref 42.7–76)
NRBC BLD AUTO-RTO: 0 /100 WBC (ref 0–0.2)
PLATELET # BLD AUTO: 151 10*3/MM3 (ref 140–450)
PMV BLD AUTO: 11.1 FL (ref 6–12)
RBC # BLD AUTO: 3.72 10*6/MM3 (ref 3.77–5.28)
WBC # BLD AUTO: 8.67 10*3/MM3 (ref 3.4–10.8)

## 2021-11-10 PROCEDURE — 0503F POSTPARTUM CARE VISIT: CPT | Performed by: NURSE PRACTITIONER

## 2021-11-10 PROCEDURE — 85025 COMPLETE CBC W/AUTO DIFF WBC: CPT | Performed by: OBSTETRICS & GYNECOLOGY

## 2021-11-10 RX ADMIN — DOCUSATE SODIUM 100 MG: 100 CAPSULE, LIQUID FILLED ORAL at 09:30

## 2021-11-10 RX ADMIN — DOCUSATE SODIUM 100 MG: 100 CAPSULE, LIQUID FILLED ORAL at 20:14

## 2021-11-10 RX ADMIN — IBUPROFEN 600 MG: 600 TABLET, FILM COATED ORAL at 06:09

## 2021-11-10 RX ADMIN — IBUPROFEN 600 MG: 600 TABLET, FILM COATED ORAL at 11:46

## 2021-11-10 RX ADMIN — Medication 1 APPLICATION: at 13:27

## 2021-11-10 RX ADMIN — IBUPROFEN 600 MG: 600 TABLET, FILM COATED ORAL at 17:33

## 2021-11-10 NOTE — PLAN OF CARE
"Goal Outcome Evaluation:   Mother's 2nd child, attempted to nurse 1st with infant never really latching. Maternal breasts normal, areola rounded/ pliable with short nipple. Assisted with infant positioning, football and latch R breast with infant able to latch but not deep. Attempted several times then utilized and instructed 24mm shield with infant l/o and NW 10\". Instructed in importance of skin to skin. Encouraged and instructed importance of waking infant and attempting to nurse every 2-3hrs. Instructed waking techniques. Instructed presence of and importance of colostrum.  Instructed in ss adq latch and suck including ss complications to report. Instructed in ss adq infant intake. Given and reviewed \"Breastfeeding is Going Well When/ Not Going Well\". Given and reviewed,\"Baby's 2nd Day/Night\". Verified mother has pump for DC. To call if need or concern. VU               "

## 2021-11-10 NOTE — PROGRESS NOTES
11/10/2021  PPD #1    Subjective   Mady feels well.  Patient describes her lochia less than menses.  Pain is well controlled       Objective   Temp: Temp:  [97.8 °F (36.6 °C)-99.1 °F (37.3 °C)] 97.8 °F (36.6 °C) Temp src: Oral   BP: BP: ()/(50-61) 107/52        Pulse: Heart Rate:  [60-94] 70  RR: Resp:  [12-18] 16    General:  No acute distress   Abdomen: Fundus firm and beneath umbilicus   Pelvis: deferred     Lab Results   Component Value Date    WBC 6.34 11/08/2021    HGB 10.4 (L) 11/08/2021    HCT 32.9 (L) 11/08/2021    MCV 86.1 11/08/2021     11/08/2021    HEPBSAG Negative 04/05/2021       Assessment  1. PPD# 1 after vaginal delivery   2. Infant male, doing well. Desires circumcision    Plan  1. Routine postpartum care.      This note has been electronically signed.    Chasity Martinez, APRN  November 10, 2021

## 2021-11-10 NOTE — ANESTHESIA POSTPROCEDURE EVALUATION
Patient: Mady Brown    Procedure Summary     Date: 11/09/21 Room / Location:     Anesthesia Start: 0826 Anesthesia Stop: 1208    Procedure: LABOR ANALGESIA Diagnosis:     Scheduled Providers:  Provider: Vania Patterson DO    Anesthesia Type: epidural ASA Status: 2          Anesthesia Type: epidural    Vitals  Vitals Value Taken Time   /52 11/10/21 0800   Temp 97.8 °F (36.6 °C) 11/10/21 0800   Pulse 70 11/10/21 0800   Resp 16 11/10/21 0800   SpO2             Post Anesthesia Care and Evaluation    Patient location during evaluation: bedside  Patient participation: complete - patient participated  Level of consciousness: awake and alert  Pain management: adequate  Airway patency: patent  Anesthetic complications: No anesthetic complications    Cardiovascular status: acceptable  Respiratory status: acceptable  Hydration status: acceptable  Post Neuraxial Block status: Motor and sensory function returned to baseline and No signs or symptoms of PDPH

## 2021-11-11 VITALS
BODY MASS INDEX: 26.37 KG/M2 | SYSTOLIC BLOOD PRESSURE: 97 MMHG | HEIGHT: 68 IN | WEIGHT: 174 LBS | TEMPERATURE: 98.3 F | HEART RATE: 78 BPM | RESPIRATION RATE: 14 BRPM | DIASTOLIC BLOOD PRESSURE: 56 MMHG

## 2021-11-11 PROCEDURE — 0503F POSTPARTUM CARE VISIT: CPT | Performed by: NURSE PRACTITIONER

## 2021-11-11 RX ORDER — IBUPROFEN 600 MG/1
600 TABLET ORAL EVERY 6 HOURS SCHEDULED
Qty: 30 TABLET | Refills: 0 | Status: SHIPPED | OUTPATIENT
Start: 2021-11-11 | End: 2023-03-15

## 2021-11-11 RX ADMIN — IBUPROFEN 600 MG: 600 TABLET, FILM COATED ORAL at 08:26

## 2021-11-11 RX ADMIN — IBUPROFEN 600 MG: 600 TABLET, FILM COATED ORAL at 12:12

## 2021-11-11 RX ADMIN — DOCUSATE SODIUM 100 MG: 100 CAPSULE, LIQUID FILLED ORAL at 08:26

## 2021-11-11 NOTE — DISCHARGE SUMMARY
Discharge Summary    Date of Admission: 2021  Date of Discharge:  2021      Patient: Mady Brown      MR#:[id number]    Delivery Provider: Radha Serrano     Discharge Surgeon/OB: Zach    Presenting Problem/History of Present Illness  Pregnant [Z34.90]     Patient Active Problem List   Diagnosis   • History of macrosomia in infant in prior pregnancy, currently pregnant in third trimester   • Sciatic pain, unspecified laterality   • 38 weeks gestation of pregnancy   • Pregnant         Discharge Diagnosis: Vaginal delivery at 39w5d    Procedures:  Vaginal, Spontaneous     2021    12:08 PM        Discharge Date: 2021;     Hospital Course  Patient is a 31 y.o. female  at 39w5d status post vaginal delivery without complication. Postpartum the patient did well. She remained afebrile, with vital signs stable. She was ready for discharge on postpartum day 2.     Infant:   male  fetus 4060 g (8 lb 15.2 oz)  with Apgar scores of 8 , 9  at five minutes. Desires circumcision    Condition on Discharge:  Stable    Vital Signs  Temp:  [98 °F (36.7 °C)-98.3 °F (36.8 °C)] 98.3 °F (36.8 °C)  Heart Rate:  [76-78] 78  Resp:  [14-16] 14  BP: ()/(53-66) 97/56    Lab Results   Component Value Date    WBC 8.67 11/10/2021    HGB 10.1 (L) 11/10/2021    HCT 32.9 (L) 11/10/2021    MCV 88.4 11/10/2021     11/10/2021       Discharge Disposition  Home or Self Care    Discharge Medications     Discharge Medications      New Medications      Instructions Start Date   ibuprofen 600 MG tablet  Commonly known as: ADVIL,MOTRIN   600 mg, Oral, Every 6 Hours Scheduled         Continue These Medications      Instructions Start Date   prenatal vitamin 27-0.8 27-0.8 MG tablet tablet   Oral, Daily         Stop These Medications    COVID-19 Specimen Collection kit            Discharge Diet:     Activity at Discharge:   Activity Instructions     Pelvic Rest            Follow-up Appointments  No future  appointments.  Additional Instructions for the Follow-ups that You Need to Schedule     Call MD With Problems / Concerns   As directed      Discharge Follow-up with Specified Provider: Zach; 6 Weeks   As directed      To: Zach    Follow Up: 6 Weeks               CULLEN Iniguez  11/11/21  09:47 EST  Csd

## 2021-11-11 NOTE — LACTATION NOTE
Assisted with positioning and latch L breast, cross-cradle. Infant l/o and NW without shield after few attempts. To call if further need or concern. VU

## 2021-11-12 NOTE — PAYOR COMM NOTE
"Mady Brown (31 y.o. Female)     Auth#[id number]    Discharged 11/11/21 with Baby.    From: Jamee Barajas  #[phone]  Fax#[phone]              Date of Birth Social Security Number Address Home Phone MRN    1990  [address] [address] [phone] [id number]    Sabianist Marital Status             None        Admission Date Admission Type Admitting Provider Attending Provider Department, Room/Bed    11/8/21 Elective Radha Serrano MD  [facility] MOTHER BABY 4B, N440/1    Discharge Date Discharge Disposition Discharge Destination          11/11/2021 Home or Self Care              Attending Provider: (none)   Allergies: No Known Allergies    Isolation: None   Infection: None   Code Status: Prior   Advance Care Planning Activity    Ht: 172.7 cm (68\")   Wt: 78.9 kg (174 lb)    Admission Cmt: None   Principal Problem: None                Active Insurance as of 11/8/2021     Primary Coverage     Payor Plan Insurance Group Employer/Plan Group    [employer] [place] [employer] [place]      Payor Plan Address Payor Plan Phone Number Payor Plan Fax Number Effective Dates    [address] [address] [phone]  3/5/2021 - None Entered    [facility] [id number]       Subscriber Name Subscriber Birth Date Member ID       MADY BROWN 1990 [id number]                 Emergency Contacts      (Rel.) Home Phone Work Phone Mobile Phone    TORRI BROWN (Spouse) [phone] -- --               Operative/Procedure Notes (last 7 days)      Stoney London III, MD at 11/08/21 1943      Pre-procedure Diagnoses    1. Unfavorable cervix in term pregnancy [O34.40]           Post-procedure Diagnoses    1. Unfavorable cervix in term pregnancy [O34.40]           Procedures    1. INSERTION OF CERVICAL DILATOR [OBO3 (Custom)]             Patient admitted for elective induction of labor at 39 weeks 4 days gestation.  Request received for placement of transcervical " "Mancia bulb for cervical ripening.  Cervical exam: 2 cm / 60%/-3 station (cervix posterior and medium texture).  Cephalic presentation confirmed by bedside ultrasound.  Transcervical Mancia placed per physician request.  FHT's class 1.  Patient tolerated well.  24 Frisian, 60ml.    Stoney Pierce \"Fleming\" FRANNIE London MD  2021  19:44 EST          Electronically signed by Stoney London III, MD at 21 1944     Radha Serrano MD at 21 1340            2021    Patient:Mady Brown    MR#:[id number]    Vaginal Delivery Note  31 y.o. yo female  at 39w5d    Patient Active Problem List   Diagnosis   • History of macrosomia in infant in prior pregnancy, currently pregnant in third trimester   • Sciatic pain, unspecified laterality   • 38 weeks gestation of pregnancy   • Pregnant       Delivery     Delivery: Vaginal, Spontaneous     YOB: 2019    Time of Birth: 12:08 PM      Anesthesia: Epidural     Delivering clinician: Radha Serrano    Forceps?   No   Vacuum? No    Shoulder dystocia present: No          Infant    Findings: male  infant     Infant observations: Weight: 4060 g (8 lb 15.2 oz)     Observations/Comments:        Apgars: 8  @ 1 minute /    9  @ 5 minutes         Placenta, Cord, and Fluid    Placenta delivered  Spontaneous  at  2021 12:18 PM     Cord: 3 vessels  present.   Nuchal Cord?  no   Cord blood obtained: Yes    Cord gases obtained:  No    Cord gas results: Pending         Repair    Episiotomy: No   Lacerations: Yes  Laceration Information  Laceration Repaired?   Perineal: 2nd  Yes    Periurethral:       Labial:       Sulcus:       Vaginal:       Cervical:         Suture used for repair: 2-0 Vicryl, rapide   Estimated Blood Loss:  200 mls.         Complications  none    Disposition  Mother to Mother Baby/Postpartum  in stable condition currently.  Baby to remains with mom  in stable condition currently.    Description of Delivery  Patient pushed " well x 1 contraction and delivered easily from OA presentation.   Shoulders delivered easily. Nose and mouth was bulb suctioned and baby was placed on mother's abdomen.  Cord was milked,  clamped and cut.  2nd degree Repair was done next.  Placenta delivered easily and with pitocin bolus, there was no uterine atony.       Radha Serrano MD  21  13:40 EST            Electronically signed by Radha Serrano MD at 21 1341          Discharge Summary      Chasity Martinez APRN at 21 0947          Discharge Summary    Date of Admission: 2021  Date of Discharge:  2021      Patient: Mady Brown      MR#:[id number]    Delivery Provider: Radha Serrano     Discharge Surgeon/OB: Zach    Presenting Problem/History of Present Illness  Pregnant [Z34.90]     Patient Active Problem List   Diagnosis   • History of macrosomia in infant in prior pregnancy, currently pregnant in third trimester   • Sciatic pain, unspecified laterality   • 38 weeks gestation of pregnancy   • Pregnant         Discharge Diagnosis: Vaginal delivery at 39w5d    Procedures:  Vaginal, Spontaneous     2021    12:08 PM        Discharge Date: 2021;     Hospital Course  Patient is a 31 y.o. female  at 39w5d status post vaginal delivery without complication. Postpartum the patient did well. She remained afebrile, with vital signs stable. She was ready for discharge on postpartum day 2.     Infant:   male  fetus 4060 g (8 lb 15.2 oz)  with Apgar scores of 8 , 9  at five minutes. Desires circumcision    Condition on Discharge:  Stable    Vital Signs  Temp:  [98 °F (36.7 °C)-98.3 °F (36.8 °C)] 98.3 °F (36.8 °C)  Heart Rate:  [76-78] 78  Resp:  [14-16] 14  BP: ()/(53-66) 97/56    Lab Results   Component Value Date    WBC 8.67 11/10/2021    HGB 10.1 (L) 11/10/2021    HCT 32.9 (L) 11/10/2021    MCV 88.4 11/10/2021     11/10/2021       Discharge Disposition  Home or Self  Care    Discharge Medications     Discharge Medications      New Medications      Instructions Start Date   ibuprofen 600 MG tablet  Commonly known as: ADVIL,MOTRIN   600 mg, Oral, Every 6 Hours Scheduled         Continue These Medications      Instructions Start Date   prenatal vitamin 27-0.8 27-0.8 MG tablet tablet   Oral, Daily         Stop These Medications    COVID-19 Specimen Collection kit            Discharge Diet:     Activity at Discharge:   Activity Instructions     Pelvic Rest            Follow-up Appointments  No future appointments.  Additional Instructions for the Follow-ups that You Need to Schedule     Call MD With Problems / Concerns   As directed      Discharge Follow-up with Specified Provider: Zach; 6 Weeks   As directed      To: Zach    Follow Up: 6 Weeks               CULLEN Iniguez  11/11/21  09:47 EST  Csd            Electronically signed by Chasity Martinez APRN at 11/11/21 0948

## 2021-11-14 PROBLEM — Z34.83 PRENATAL CARE, SUBSEQUENT PREGNANCY, THIRD TRIMESTER: Status: ACTIVE | Noted: 2021-11-14

## 2022-01-10 ENCOUNTER — POSTPARTUM VISIT (OUTPATIENT)
Dept: OBSTETRICS AND GYNECOLOGY | Facility: CLINIC | Age: 32
End: 2022-01-10

## 2022-01-10 VITALS
DIASTOLIC BLOOD PRESSURE: 62 MMHG | WEIGHT: 164.8 LBS | BODY MASS INDEX: 24.98 KG/M2 | SYSTOLIC BLOOD PRESSURE: 102 MMHG | HEIGHT: 68 IN

## 2022-01-10 PROCEDURE — 0503F POSTPARTUM CARE VISIT: CPT | Performed by: NURSE PRACTITIONER

## 2022-01-10 NOTE — PROGRESS NOTES
"Chief Complaint   Patient presents with   • Postpartum Care     6 week       6 Week Postpartum Visit         Mady Brown is a 31 y.o.  s/p  at 39.5 weeks on 2021, who presents today for a 6 week postpartum check.  Patient states she did have intercourse last week and the condom failed.  She would like to have a pregnancy test when appropriate. She stopped breast pumping last week.         At the time of delivery were you diagnosed with any of the following: None. The Patinet stated she had a previous episiotomy that Dr. Serrano stated reopened during delivery.  and is healing well.    Patient denies postpartum depression.  Patient describes bleeding as absent.  Patient is bottle feeding.  Desires contraceptive methods: Condoms for contraception.  Patient denies bowel or bladder issues.    Postpartum Depression Screening Questionnaire: 3, No treatment indicated.  Baby Name: Antonio   Baby Weight: 8 lbs 15.2 lbs   Baby Discharged: Discharged with Mom  Delivering Physician: Dr. Serrano     Last Completed Pap Smear     This patient has no relevant Health Maintenance data.        Is the patient due for a pap today? Yes.  Performed Today    The additional following portions of the patient's history were reviewed and updated as appropriate: allergies, current medications, past family history, past medical history, past social history and past surgical history.    Review of Systems   Constitutional: Negative.    HENT: Negative.    Respiratory: Negative.    Cardiovascular: Negative.    Gastrointestinal: Negative.    Genitourinary: Negative.    Psychiatric/Behavioral: Negative.      All other systems reviewed and are negative.     I have reviewed and agree with the HPI, ROS, and historical information as entered above. Barbara Bergeron, APRN    /62 (BP Location: Right arm, Patient Position: Sitting, Cuff Size: Adult)   Ht 172.7 cm (68\")   Wt 74.8 kg (164 lb 12.8 oz)   LMP  (LMP Unknown)   " Breastfeeding No   BMI 25.06 kg/m²     Physical Exam  Vitals and nursing note reviewed. Exam conducted with a chaperone present.   Constitutional:       Appearance: Normal appearance. She is normal weight.   Abdominal:      Palpations: Abdomen is soft.      Tenderness: There is no abdominal tenderness.   Genitourinary:     General: Normal vulva.      Labia:         Right: No rash, tenderness or lesion.         Left: No rash, tenderness or lesion.       Urethra: No urethral swelling.      Vagina: Normal. No vaginal discharge, tenderness or bleeding.      Cervix: No cervical motion tenderness, discharge, friability, lesion, erythema or cervical bleeding.      Uterus: Normal. Not enlarged and not tender.       Adnexa: Right adnexa normal and left adnexa normal.        Right: No mass, tenderness or fullness.          Left: No mass, tenderness or fullness.        Rectum: Normal. No external hemorrhoid.   Neurological:      Mental Status: She is alert.             Assessment and Plan    Problem List Items Addressed This Visit     None      Visit Diagnoses     Postpartum follow-up    -  Primary    Relevant Orders    Pap IG, HPV-hr          1. S/p Vaginal delivery, 6 weeks postpartum.  Doing well.    2. Return to normal physical activity.  No pelvic restrictions.   3. Contraception: contraceptive methods: Condoms      Barbara Bergeron, APRN  01/10/2022

## 2022-01-12 ENCOUNTER — TELEPHONE (OUTPATIENT)
Dept: OBSTETRICS AND GYNECOLOGY | Facility: CLINIC | Age: 32
End: 2022-01-12

## 2022-01-12 DIAGNOSIS — Z13.71 SCREENING FOR GENETIC DISEASE CARRIER STATUS: Primary | ICD-10-CM

## 2022-01-12 DIAGNOSIS — Z13.228 CYSTIC FIBROSIS SCREENING: ICD-10-CM

## 2022-01-12 DIAGNOSIS — Z14.1 SUSPECTED CARRIER OF CYSTIC FIBROSIS: ICD-10-CM

## 2022-01-12 NOTE — TELEPHONE ENCOUNTER
Reviewed previous notes and screenings. Informed patient that we typically only do the cystic fibrosis carrier screening if family history or risk factors present. Verbalized understanding. Per [facility], patient may have the lab drawn in office; provided patient with contact information for [facility] ([phone]).    Order on patient's chart for cystic fibrosis carrier testing; please release upon patient arrival.

## 2022-01-12 NOTE — TELEPHONE ENCOUNTER
Patient called and stated that her son has just been diagnosed with the G542X mutation, cystic fibrosis. The patient requests to speak with clinical to see if we had previously tested/screened for that.

## 2022-02-01 ENCOUNTER — TELEPHONE (OUTPATIENT)
Dept: OBSTETRICS AND GYNECOLOGY | Facility: CLINIC | Age: 32
End: 2022-02-01

## 2022-02-22 ENCOUNTER — TELEPHONE (OUTPATIENT)
Dept: OBSTETRICS AND GYNECOLOGY | Facility: CLINIC | Age: 32
End: 2022-02-22

## 2022-02-22 DIAGNOSIS — Z83.49 FAMILY HISTORY OF CYSTIC FIBROSIS: Primary | ICD-10-CM

## 2022-02-22 NOTE — TELEPHONE ENCOUNTER
Will refer to genetic counseling as they can set up testing, getting prior approval with insurance and discuss follow up needed.     lmcb with patient.

## 2022-02-22 NOTE — TELEPHONE ENCOUNTER
Patient called and said insurance told her they need a perarticulation to do blood work for carrier for cystic fibrosis

## 2022-02-22 NOTE — TELEPHONE ENCOUNTER
Patient son with G542X mutation-cystic fibrosis. Patient trying to have testing done for herself due to sons new dx. States [employer] insurance needing prior authorization.     Will discuss with LAYO-may be best to send patient to genetics for testing and PA. Patient vu.

## 2022-03-09 ENCOUNTER — CLINICAL SUPPORT (OUTPATIENT)
Dept: GENETICS | Facility: HOSPITAL | Age: 32
End: 2022-03-09

## 2022-03-09 DIAGNOSIS — Z13.71 GENETIC DISEASE CARRIER STATUS TESTING, FEMALE: ICD-10-CM

## 2022-03-09 DIAGNOSIS — Z83.49 FAMILY HISTORY OF CYSTIC FIBROSIS: ICD-10-CM

## 2022-03-09 DIAGNOSIS — Z13.79 GENETIC TESTING: Primary | ICD-10-CM

## 2022-03-09 PROCEDURE — 96040: CPT | Performed by: GENETIC COUNSELOR, MS

## 2022-03-14 ENCOUNTER — TELEPHONE (OUTPATIENT)
Dept: GENETICS | Facility: HOSPITAL | Age: 32
End: 2022-03-14

## 2022-03-14 NOTE — TELEPHONE ENCOUNTER
Calling patient to discuss estimated OOP cost for genetic testing (carrier screening) discussed at her appointment on 3/9/22. Patient did not answer. Left voicemail for her to return my call.    Lisa Morrissey MS, [facility], [facility]

## 2022-03-14 NOTE — PROGRESS NOTES
Mady Brown, a 31-year-old female, was referred for genetic counseling due to a family history of a cystic fibrosis carrier. She is not currently pregnant and has two sons. Ms. Bronw was interested in discussing genetic testing to provide information regarding risks for future pregnancies. Her son was recently identified as a carrier of cystic fibrosis with a CFTR G542X mutation through  screening. She opted to pursue carrier screening for 289 genes, including CFTR, through the [facility] Comprehensive Carrier Screen panel. Ms. Brown opted to pursue a prior authorization before proceeding to determine OOP cost. If Ms. Brown proceeds with testing after the prior authorization, test results will return in 2-3 weeks.     PREGNANCY AND FAMILY HISTORY: We reviewed Ms. Brown’s family and medical history in detail.  Ms. Brown’s son is a cystic fibrosis carrier of the G542X mutation. Ms. Brown’s sister had possible pancreatitis. There is no known family history of cystic fibrosis. Ms. Brown’s paternal grandmother had breast cancer in her 70’s. Her maternal grandfather had prostate cancer in his 60s or 70s. Ms. Brown’s family history is otherwise negative for known cases of intellectual disability, stillbirths, recurrent miscarriage, or known genetic conditions.    GENETIC COUNSELING:  (30 minutes) Cystic Fibrosis (CF) is an autosomal recessive condition that leads to respiratory complications and pancreatic insufficiency.  Both parents must carry mutations in the CFTR gene (the gene that causes cystic fibrosis) in order for their children to be at risk.  If both parents are carriers, there is a 1 in 4 chance (with each pregnancy) to have a child with cystic fibrosis. Ms. Brown’s son is a CF carrier, which means that he carries 1 mutated (non-working) copy of the CFTR gene and another working copy of the CFTR gene. This indicates that either Ms. Brown or her  are also a carrier. If Ms. Vega is a CF  carrier, each of her children are at 50% risk to receive the non-working copy of the CFTR gene from her.  In order for Ms. Brown and her  to have a child with Cystic Fibrosis, Ms. Brown and her  would both have to be carriers of a mutation in the CFTR gene.     We reviewed that carrier screening is available for over 289 recessive and X-linked conditions, including Cystic Fibrosis. Individuals who are carriers for these disorders typically do not have a family history of these disorders, as the majority of the conditions on the panel are inherited in an autosomal recessive pattern. Most people are carriers for several genetic diseases, but these carriers do not exhibit any symptoms of the disease. If a couple both carry the same disease, there is a 1 in 4 (25%) chance that they will have a child affected with the disease. The diseases on the panel range in severity from mild to severe. Ms. Brown was interested in pursuing carrier screening to determine whether she is at risk to have a child with one of the genetic diseases on this panel.     We briefly discussed the option for prenatal diagnosis, such as amniocentesis or chorionic villus sampling (CVS), in future pregnancies if both her and her  are found to be carriers for the same conditions. IVF with preimplantation genetic diagnosis (PGD) is also a possibility for couples who are carriers for the same condition.     PLAN: We ordered a prior authorization of the [facility] comprehensive carrier screening panel for Ms. Brown. We will contact Ms. Brown with her estimated OOP cost before proceeding with genetic testing. If Ms. Brown is positive for the CFTR G542X mutation, we recommend that Ms. Brown’s  be offered carrier screening for CFTR.       Lisa Morrissey MS, [facility], [facility]  Licensed Certified Genetic Counselor

## 2022-03-18 ENCOUNTER — LAB (OUTPATIENT)
Dept: LAB | Facility: HOSPITAL | Age: 32
End: 2022-03-18

## 2022-03-18 ENCOUNTER — CLINICAL SUPPORT (OUTPATIENT)
Dept: GENETICS | Facility: HOSPITAL | Age: 32
End: 2022-03-18

## 2022-03-18 DIAGNOSIS — Z13.79 GENETIC TESTING: Primary | ICD-10-CM

## 2022-03-18 DIAGNOSIS — Z83.49 FAMILY HISTORY OF CYSTIC FIBROSIS: ICD-10-CM

## 2022-03-21 NOTE — PROGRESS NOTES
Patient had blood draw for carrier screening as discussed in 3/9/22 documentation. Results expected in 2-3 weeks.     Lisa Morrissey MS, [facility], [facility]  Licensed Certified Genetic Counselor

## 2022-03-29 ENCOUNTER — TELEPHONE (OUTPATIENT)
Dept: OBSTETRICS AND GYNECOLOGY | Facility: CLINIC | Age: 32
End: 2022-03-29

## 2022-03-31 ENCOUNTER — TELEPHONE (OUTPATIENT)
Dept: OBSTETRICS AND GYNECOLOGY | Facility: CLINIC | Age: 32
End: 2022-03-31

## 2022-03-31 NOTE — TELEPHONE ENCOUNTER
"Pt wanted to see if she could get HPV vaccines in our office.  She also wanted us to be aware that she had an episode last month of \"feeling very emotional\" and was crying uncontrollably, eventually causing a  migraine.  It lasted for about a day and she has not had any feeling like that since.  She denies SI/HI.  Pt will continue to monitor and call back if she needs anything further.   "

## 2022-04-07 ENCOUNTER — DOCUMENTATION (OUTPATIENT)
Dept: GENETICS | Facility: HOSPITAL | Age: 32
End: 2022-04-07

## 2022-07-04 NOTE — PROGRESS NOTES
Mady Brown, a 31-year-old female, was referred for genetic counseling due to a family history of a cystic fibrosis carrier. She is not currently pregnant and has two sons. Ms. Brown was interested in discussing genetic testing to provide information regarding risks for future pregnancies. Her son was recently identified as a carrier of cystic fibrosis with a CFTR G542X mutation through  screening. She opted to pursue carrier screening for 289 genes, including CFTR, through the [facility] Comprehensive Carrier Screen panel. Test results are outlined below.     PREGNANCY AND FAMILY HISTORY: We reviewed Ms. Brown’s family and medical history in detail.  Ms. Brown’s son is a cystic fibrosis carrier of the G542X mutation. Ms. Brown’s sister had possible pancreatitis. There is no known family history of cystic fibrosis. Ms. Brown’s paternal grandmother had breast cancer in her 70’s. Her maternal grandfather had prostate cancer in his 60s or 70s. Ms. Brown’s family history is otherwise negative for known cases of intellectual disability, stillbirths, recurrent miscarriage, or known genetic conditions.    GENETIC COUNSELING: Cystic Fibrosis (CF) is an autosomal recessive condition that leads to respiratory complications and pancreatic insufficiency.  Both parents must carry mutations in the CFTR gene (the gene that causes cystic fibrosis) in order for their children to be at risk.  If both parents are carriers, there is a 1 in 4 chance (with each pregnancy) to have a child with cystic fibrosis. Ms. Brown’s son is a CF carrier, which means that he carries one mutated (non-working) copy of the CFTR gene and another working copy of the CFTR gene. This indicates that either Ms. Brown or her  are also a carrier. If Ms. Vega is a CF carrier, each of her children are at 50% risk to receive the non-working copy of the CFTR gene from her.  In order for Ms. Brown and her  to have a child with Cystic  Fibrosis, Ms. Brown and her  would both have to be carriers of a mutation in the CFTR gene.     We reviewed that carrier screening is available for over 289 recessive and X-linked conditions, including Cystic Fibrosis. Individuals who are carriers for these disorders typically do not have a family history of these disorders, as the majority of the conditions on the panel are inherited in an autosomal recessive pattern. Most people are carriers for several genetic diseases, but these carriers do not exhibit any symptoms of the disease. If a couple both carry the same disease, there is a 1 in 4 (25%) chance that they will have a child affected with the disease. The diseases on the panel range in severity from mild to severe. Ms. Brown was interested in pursuing carrier screening to determine whether she is at risk to have a child with one of the genetic diseases on this panel.     We briefly discussed the option for prenatal diagnosis, such as amniocentesis or chorionic villus sampling (CVS), in future pregnancies if both her and her  are found to be carriers for the same conditions. IVF with preimplantation genetic diagnosis (PGD) is also a possibility for couples who are carriers for the same condition.     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 Inheritance of CF is reviewed above. To clarify reproductive risks for Ms. Brown, carrier screening for her  would be recommended. No additional pathogenic mutations were identified, reducing the likelihood of her being a carrier for other diseases on the panel.  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 Brown’s carrier screening.  Pseudodeficiency alleles are not known to be associated with disease, meaning this does not affect her risk of being a carrier for  [place] disease. Individuals with pseudodeficiency alleles may exhibit false positive results on related biochemical tests, including  screening; however, pseudodeficiency alleles are not known to cause disease.     PLAN:  Genetic counseling remains available to Ms. Brown. Her  is welcome to contact our office for coordination of carrier screening. Ms. Brown is welcome to call us with any questions or concerns at [phone].       Lisa Morrissey MS, [facility] [place], [facility]  Licensed Certified Genetic Counselor       Cc: Mady Serrano MD

## 2022-12-09 ENCOUNTER — TELEPHONE (OUTPATIENT)
Dept: OBSTETRICS AND GYNECOLOGY | Facility: CLINIC | Age: 32
End: 2022-12-09

## 2022-12-09 NOTE — TELEPHONE ENCOUNTER
Caller: Mady Brown    Relationship: Self    Best call back number: [phone]    What is the best time to reach you: ANYTIME    What was the call regarding: PATIENTS REPORTING THAT SHE MAY BE PREGNANT, LAST PERIOD DATE WAS NOV 16TH ENDED ON THE 20TH. PATIENT IS NAUSEOUS, CRAMPING, BACK PAIN, AND DIZZINESS BUT HAS NOT TESTED YET AS Sunday IS THE FIRST DAY SHE CAN TEST. PATIENT ALSO REPORTS SPOTTING LAST WEEK. PATIENT IS HAVING BAD FOOD AVERSIONS, WHICH IS A COMMON SYMPTOM WITH HER LAST TWO PREGNANCIES. REQUESTING CALL BACK AS SOON AS POSSIBLE.     Do you require a callback: YES, OKAY WITH LVM

## 2022-12-09 NOTE — TELEPHONE ENCOUNTER
LAYO pt   LMP; 11/20/22    SW pt about unconfirmed pregnancy. Pt will take UPT 11/15/22 and if positive will call our office and then come in for lab work to confirm pregnancy. Informed pt that slight bleeding/cramping is normal due to increase of blood flow to uterus and cervix. Pt VU

## 2022-12-12 ENCOUNTER — OFFICE VISIT (OUTPATIENT)
Dept: OBSTETRICS AND GYNECOLOGY | Facility: CLINIC | Age: 32
End: 2022-12-12
Payer: COMMERCIAL

## 2022-12-12 ENCOUNTER — TELEPHONE (OUTPATIENT)
Dept: OBSTETRICS AND GYNECOLOGY | Facility: CLINIC | Age: 32
End: 2022-12-12

## 2022-12-12 VITALS
HEIGHT: 69 IN | DIASTOLIC BLOOD PRESSURE: 78 MMHG | BODY MASS INDEX: 22.28 KG/M2 | WEIGHT: 150.4 LBS | SYSTOLIC BLOOD PRESSURE: 108 MMHG

## 2022-12-12 DIAGNOSIS — N92.6 IRREGULAR MENSES: Primary | ICD-10-CM

## 2022-12-12 DIAGNOSIS — R11.0 NAUSEA WITHOUT VOMITING: ICD-10-CM

## 2022-12-12 PROCEDURE — 99213 OFFICE O/P EST LOW 20 MIN: CPT | Performed by: OBSTETRICS & GYNECOLOGY

## 2022-12-12 NOTE — TELEPHONE ENCOUNTER
S/w pt she states she has been having cramping that has been present since right after thanksgiving and her LMP: 11/16-11/20 and then 5-6 days after her menses she had vaginal spotting that happened once. States she is now currently having bloating, fatigue (which she states she is always tired), decreased appetite, sensitive to smells, nauseous, and swollen in the bikini area that has all been present for about 1 week.     Patient states she did take UPT yesterday that was negative and her menses have been irregular for the past 4 months approx.     Patient requested to get blood work done and to come in sooner than 1/6 if possible.       Appt. Scheduled.

## 2022-12-12 NOTE — TELEPHONE ENCOUNTER
Caller: Mady Brown    Relationship to patient: Self    Best call back number:       Requested date:     Additional notes: PT IS ENQUIRING ABOUT THE GARDISIL VACINE - PT HAS AN APPT WITH DR. CHASE 1/9/23 AND REQUESTING IF SHE CAN GET SHOT AT THAT TIME OR IF A SEPARATE VISIT WILL NEED TO BE SCHEDULED.  PLEASE CALL BACK TO CONFIRM

## 2022-12-12 NOTE — PROGRESS NOTES
Chief Complaint   Patient presents with   • Pelvic Pain   • Nausea   • Dizziness   • Insomnia   • Back Pain   • Vaginal Bleeding       Subjective   HPI  Mady Brown is a 32 y.o. female, . Her last LMP was Patient's last menstrual period was 2022 (exact date).who presents with symptoms X 2.5 weeks including abdominal/pelvic pain, spotting X1 event, back pain, dizziness and spot before her eyes, nausea, fatigue, decreased appetite, insomnia with odd dreams.     Usually has 4 days, then stops and then comes back on day 6.  Last period stopped early      Additional OB/GYN History     Last Pap : 1/10/22  Last Completed Pap Smear          Ordered - PAP SMEAR (Every 3 Years) Ordered on 2022    01/10/2022  SCANNED - PAP SMEAR                Last mammogram: None   Last Completed Mammogram     This patient has no relevant Health Maintenance data.          Tobacco Usage?: No   OB History        2    Para   2    Term   2            AB        Living   2       SAB        IAB        Ectopic        Molar        Multiple   0    Live Births   2                  Current Outpatient Medications:   •  Biotin 10 MG chewable tablet, Chew., Disp: , Rfl:   •  ibuprofen (ADVIL,MOTRIN) 600 MG tablet, Take 1 tablet by mouth Every 6 (Six) Hours., Disp: 30 tablet, Rfl: 0     Past Medical History:   Diagnosis Date   • Ovarian cyst    • Seizures (HCC)     in childhood, outgrew them per patient         Past Surgical History:   Procedure Laterality Date   • WISDOM TOOTH EXTRACTION         The additional following portions of the patient's history were reviewed and updated as appropriate: allergies, current medications, past family history, past medical history, past social history, past surgical history and problem list.    Review of Systems    I have reviewed and agree with the HPI, ROS, and historical information as entered above. Radha Serrano MD    Objective   /78   Ht 175.3 cm (69\")    Wt 68.2 kg (150 lb 6.4 oz)   LMP 11/16/2022 (Exact Date)   BMI 22.21 kg/m²     Physical Exam    Physical Exam:  General:  well developed; well nourished  no acute distress   Abdomen: soft, non-tender; no masses  no umbilical or inguinal hernias are present   Pelvis: Not performed. today       Assessment & Plan     Assessment     Problem List Items Addressed This Visit     Irregular menses - Primary    Relevant Orders    HCG, B-subunit, Quantitative (Completed)    Nausea without vomiting       Plan     Return for Annual physical.   UPT neg, but HCG done to be sure.  Reviewed not to get too worried about one cycle that is off.  Can be r/t lots of things including stress.      Radha Serrano MD  12/12/2022

## 2022-12-12 NOTE — TELEPHONE ENCOUNTER
Pt called back and stated shes still having the same symptoms. At home preg. Tested again and it was negative

## 2022-12-13 LAB
HCG INTACT+B SERPL-ACNC: <1 MIU/ML
T4 FREE SERPL-MCNC: 1.26 NG/DL (ref 0.93–1.7)
TSH SERPL DL<=0.005 MIU/L-ACNC: 1.05 UIU/ML (ref 0.27–4.2)

## 2023-01-06 PROBLEM — R11.0 NAUSEA WITHOUT VOMITING: Status: ACTIVE | Noted: 2023-01-06

## 2023-01-06 PROBLEM — N92.6 IRREGULAR MENSES: Status: ACTIVE | Noted: 2023-01-06

## 2023-01-09 ENCOUNTER — OFFICE VISIT (OUTPATIENT)
Dept: OBSTETRICS AND GYNECOLOGY | Facility: CLINIC | Age: 33
End: 2023-01-09
Payer: COMMERCIAL

## 2023-01-09 VITALS
HEIGHT: 69 IN | DIASTOLIC BLOOD PRESSURE: 78 MMHG | BODY MASS INDEX: 23.37 KG/M2 | WEIGHT: 157.8 LBS | SYSTOLIC BLOOD PRESSURE: 110 MMHG

## 2023-01-09 DIAGNOSIS — Z31.69 PRE-CONCEPTION COUNSELING: Primary | ICD-10-CM

## 2023-01-09 DIAGNOSIS — Z23 NEED FOR VACCINATION: ICD-10-CM

## 2023-01-09 PROCEDURE — 90651 9VHPV VACCINE 2/3 DOSE IM: CPT | Performed by: OBSTETRICS & GYNECOLOGY

## 2023-01-09 PROCEDURE — 90471 IMMUNIZATION ADMIN: CPT | Performed by: OBSTETRICS & GYNECOLOGY

## 2023-01-09 PROCEDURE — 99212 OFFICE O/P EST SF 10 MIN: CPT | Performed by: OBSTETRICS & GYNECOLOGY

## 2023-01-09 NOTE — PROGRESS NOTES
"            Chief Complaint   Patient presents with   • Gynecologic Exam       Subjective   HPI  Mady Brown is a 32 y.o. female, . Her last LMP was Patient's last menstrual period was 2022 (exact date). Who presents for follow up from last visit for irregular menses and nausea vomiting.      Preconceptual counseling.    At her last visit her UPT and HCG were negative.Since then she reports her symptoms/issue has improved.      Additional OB/GYN History     Last Pap : 01/10/2022 ASCUS, HPV negative  Last Completed Pap Smear          PAP SMEAR (Every 3 Years) Next due on 1/10/2025    01/10/2022  SCANNED - PAP SMEAR                Last mammogram: N/A  Last Completed Mammogram     This patient has no relevant Health Maintenance data.          Tobacco Usage?: No   OB History        2    Para   2    Term   2            AB        Living   2       SAB        IAB        Ectopic        Molar        Multiple   0    Live Births   2                  Current Outpatient Medications:   •  Biotin 10 MG chewable tablet, Chew., Disp: , Rfl:   •  ibuprofen (ADVIL,MOTRIN) 600 MG tablet, Take 1 tablet by mouth Every 6 (Six) Hours., Disp: 30 tablet, Rfl: 0     Past Medical History:   Diagnosis Date   • Ovarian cyst    • Seizures (HCC)     in childhood, outgrew them per patient         Past Surgical History:   Procedure Laterality Date   • WISDOM TOOTH EXTRACTION         The additional following portions of the patient's history were reviewed and updated as appropriate: allergies, current medications, past family history, past medical history, past social history, past surgical history and problem list.    Review of Systems    I have reviewed and agree with the HPI, ROS, and historical information as entered above. Radha Serrano MD    Objective   /78   Ht 175.3 cm (69.02\")   Wt 71.6 kg (157 lb 12.8 oz)   LMP 2022 (Exact Date)   BMI 23.29 kg/m²     Physical Exam    Physical " Exam:  General:  well developed; well nourished  no acute distress   Abdomen: soft, non-tender; no masses  no umbilical or inguinal hernias are present   Pelvis: Not performed.     Assessment & Plan     Assessment     Problem List Items Addressed This Visit     Pre-conception counseling - Primary    Need for vaccination       Plan     Return for Annual physical or if pregnant sooner.  1. All questions answered.  2.  still needs carrier screening for CF gene.  3. Gardasil #1 today      Radha Serrano MD  01/09/2023

## 2023-01-21 PROBLEM — Z23 NEED FOR VACCINATION: Status: ACTIVE | Noted: 2023-01-21

## 2023-01-21 PROBLEM — Z31.69 PRE-CONCEPTION COUNSELING: Status: ACTIVE | Noted: 2023-01-21

## 2023-03-15 ENCOUNTER — CLINICAL SUPPORT (OUTPATIENT)
Dept: OBSTETRICS AND GYNECOLOGY | Facility: CLINIC | Age: 33
End: 2023-03-15
Payer: COMMERCIAL

## 2023-03-15 VITALS
SYSTOLIC BLOOD PRESSURE: 122 MMHG | HEIGHT: 69 IN | BODY MASS INDEX: 22.22 KG/M2 | DIASTOLIC BLOOD PRESSURE: 62 MMHG | WEIGHT: 150 LBS

## 2023-03-15 DIAGNOSIS — Z23 NEED FOR HPV VACCINE: ICD-10-CM

## 2023-03-15 DIAGNOSIS — Z71.85 HPV VACCINE COUNSELING: Primary | ICD-10-CM

## 2023-03-15 PROCEDURE — 90471 IMMUNIZATION ADMIN: CPT | Performed by: OBSTETRICS & GYNECOLOGY

## 2023-03-15 PROCEDURE — 90651 9VHPV VACCINE 2/3 DOSE IM: CPT | Performed by: OBSTETRICS & GYNECOLOGY

## 2023-03-15 NOTE — PROGRESS NOTES
HPV Injection Note  Mady Brown is a 32 y.o. female here for counseling on HPV vaccination. Her LMP was 03/08/2023.     She has received and reviewed the Gardasil Information Sheet. Contraindications have been reviewed.    Information Reviewed  Ester Diallo RN reviewed the possible side effects of pain, swelling, itching at the injection site, fever, difficulty breathing and anaphylactic reactions. The visit involved 30 minutes of counseling on STD prevention and vaccine benefits.    Injection Details  Noted in the Immunization Activity.    Future Injections  Her next injection is in 4 months: 07/15/2023.     Toleration  Patient tolerated the injection well with no problems.    Electronically Signed By:  Ester Diallo RN  03/15/2023

## 2023-05-30 ENCOUNTER — TELEPHONE (OUTPATIENT)
Dept: OBSTETRICS AND GYNECOLOGY | Facility: CLINIC | Age: 33
End: 2023-05-30

## 2023-05-31 ENCOUNTER — TELEPHONE (OUTPATIENT)
Dept: OBSTETRICS AND GYNECOLOGY | Facility: CLINIC | Age: 33
End: 2023-05-31

## 2023-05-31 NOTE — TELEPHONE ENCOUNTER
Caller: Mady Brown    Relationship: Self    Best call back number: [phone]    What was the call regarding: PT WAITING ON A CALL BACK FROM YESTERDAY REGARDING PT MISSING HER 3RD HPV VACCINE AND WHAT SHE NEEDS TO DO

## 2023-05-31 NOTE — TELEPHONE ENCOUNTER
Returned patient's call. She states she is 2 weeks past due for Gardasil dose #3 and unsure what to do. Reviewed chart. 2nd dose was given 3/15/23. Informed her 3rd dose not due until 7/15/23. She v/u and states she plans to try to conceive this fall. Asking how long after HPV vaccine she should wait. Discussed with multiple APRNs and reviewed Up to Date information; informed patient there is no need to wait to try to conceive. She states she has previously been told there is risk of birth defects associated with the vaccine. Will check with Dr. Serrano and call patient back or send [employer] message. She v/u and agreed.

## 2023-08-16 ENCOUNTER — TELEPHONE (OUTPATIENT)
Dept: OBSTETRICS AND GYNECOLOGY | Facility: CLINIC | Age: 33
End: 2023-08-16
Payer: COMMERCIAL

## 2023-08-16 NOTE — TELEPHONE ENCOUNTER
LMP 7/26 she is 1 week from her period. Informed it could be her period started early or even late ovulation. Instructed to monitor and check a UPT in a couple of days, as long as it remains negative then monitor sx, if it is positive then call us back. She NABEEL. She has been actively trying to conceive since June and is concerned it hasn't happened yet because her previous pregnancies happened right away. Informed it is normal to take up to a year of actively trying and would not be concerning at this time. She also questions if there is a link to the COVID vaccine and conception, informed I have not seen this be an issues. She VU

## 2023-08-16 NOTE — TELEPHONE ENCOUNTER
Caller: Mady Brown    Relationship: Self    Best call back number: [phone]    What is the best time to reach you: ANYTIME - LVM    Who are you requesting to speak with (clinical staff, provider,  specific staff member): CLINICAL    PT IS CALLING TO GET SOME ASSISTANCE - HER AND HER  ARE TRYING TO CONCEIVE - PT IS 6 DAYS FROM CYCLE AND IS EXPERIENCING SOME ABDOMINAL CRAMPING - PT TOOK A PREGNANCY TEST YESTERDAY AFTERNOON AND IT WAS NEGATIVE - PT IS WANTING TO SPEAK TO A NURSE TO SEE IF SHE SHOULD BE CONCERNED     PLEASE CALL THE PT TO DISCUSS    THANK YOU!

## 2023-09-20 ENCOUNTER — INITIAL PRENATAL (OUTPATIENT)
Dept: OBSTETRICS AND GYNECOLOGY | Facility: CLINIC | Age: 33
End: 2023-09-20
Payer: COMMERCIAL

## 2023-09-20 VITALS — SYSTOLIC BLOOD PRESSURE: 108 MMHG | WEIGHT: 154 LBS | BODY MASS INDEX: 22.74 KG/M2 | DIASTOLIC BLOOD PRESSURE: 66 MMHG

## 2023-09-20 DIAGNOSIS — R11.0 NAUSEA WITHOUT VOMITING: ICD-10-CM

## 2023-09-20 DIAGNOSIS — Z14.1 CYSTIC FIBROSIS CARRIER: ICD-10-CM

## 2023-09-20 DIAGNOSIS — Z34.81 MULTIGRAVIDA IN FIRST TRIMESTER: Primary | ICD-10-CM

## 2023-09-20 DIAGNOSIS — O09.299 HISTORY OF MACROSOMIA IN INFANT IN PRIOR PREGNANCY, CURRENTLY PREGNANT, UNSPECIFIED TRIMESTER: ICD-10-CM

## 2023-09-20 PROBLEM — Z31.69 PRE-CONCEPTION COUNSELING: Status: RESOLVED | Noted: 2023-01-21 | Resolved: 2023-09-20

## 2023-09-20 PROBLEM — M54.30: Status: RESOLVED | Noted: 2021-08-18 | Resolved: 2023-09-20

## 2023-09-20 PROBLEM — Z3A.38 38 WEEKS GESTATION OF PREGNANCY: Status: RESOLVED | Noted: 2021-11-01 | Resolved: 2023-09-20

## 2023-09-20 PROBLEM — Z23 NEED FOR VACCINATION: Status: RESOLVED | Noted: 2023-01-21 | Resolved: 2023-09-20

## 2023-09-20 PROBLEM — Z34.83 PRENATAL CARE, SUBSEQUENT PREGNANCY, THIRD TRIMESTER: Status: RESOLVED | Noted: 2021-11-14 | Resolved: 2023-09-20

## 2023-09-20 PROBLEM — N92.6 IRREGULAR MENSES: Status: RESOLVED | Noted: 2023-01-06 | Resolved: 2023-09-20

## 2023-09-20 RX ORDER — PROMETHAZINE HYDROCHLORIDE 25 MG/1
25 TABLET ORAL EVERY 6 HOURS PRN
Qty: 30 TABLET | Refills: 0 | Status: SHIPPED | OUTPATIENT
Start: 2023-09-20

## 2023-09-20 RX ORDER — DIPHENHYDRAMINE HCL 25 MG
25 CAPSULE ORAL EVERY 6 HOURS PRN
COMMUNITY

## 2023-09-20 NOTE — PROGRESS NOTES
Initial ob visit     CC- Here for care of pregnancy        Mady Brown is a 33 y.o. female, , who presents for her first obstetrical visit.  Her last LMP was Patient's last menstrual period was 2023 (exact date).. Her MOODY is 2024, by Last Menstrual Period. Current GA is 8w0d.     Initial positive test date : 2023, UPT        Her periods are: every 4 weeks  Prior obstetric issues: none  Patient's past medical history is significant for:  None .  Family history of genetic issues (includes FOB): Heart birth defect,Paternal Grandmother  Prior infections concerning in pregnancy (Rash, fever in last 2 weeks): No  Varicella Hx - vaccinated  Prior testing for Cystic Fibrosis Carrier or Sickle Cell Trait- Yes, second child is carrier and so is mother of baby  Prepregnancy BMI - Body mass index is 22.74 kg/m².  History of STD: no  Hx of HSV for patient or partner: no  Ultrasound Today: yes    OB History    Para Term  AB Living   3 2 2 0 0 2   SAB IAB Ectopic Molar Multiple Live Births   0 0 0 0 0 2      # Outcome Date GA Lbr Roberto/2nd Weight Sex Delivery Anes PTL Lv   3 Current            2 Term 21 39w5d 16:16 / 00:13 4060 g (8 lb 15.2 oz) M Vag-Spont EPI N LAI   1 Term 20 40w1d  4082 g (9 lb) M Vag-Spont EPI  LAI       Additional Pertinent History   Last Pap : 01/10/2022 Result: ASCUS HPV: negative     Last Completed Pap Smear            PAP SMEAR (Every 3 Years) Next due on 1/10/2025      01/10/2022  SCANNED - PAP SMEAR                  History of abnormal Pap smear: yes - ASCUS HPV negative  Family history of uterine, colon, breast, or ovarian cancer: no  Feelings of Anxiety or Depression: yes - Anxiety, not currently treated  Tobacco Usage?: No   Alcohol/Drug Use?: NO  Over the age of 35 at delivery: no  Desires Genetic Screening: Cell Free DNA       PMH    Current Outpatient Medications:     diphenhydrAMINE (BENADRYL) 25 mg capsule, Take 1 capsule by mouth Every 6  (Six) Hours As Needed for Itching., Disp: , Rfl:     Prenatal Vit-Fe Fumarate-FA (PRENATAL VITAMIN PO), Take  by mouth., Disp: , Rfl:     promethazine (PHENERGAN) 25 MG tablet, Take 1 tablet by mouth Every 6 (Six) Hours As Needed for Nausea or Vomiting., Disp: 30 tablet, Rfl: 0     Past Medical History:   Diagnosis Date    Cystic fibrosis carrier     Ovarian cyst     Seizures     in childhood, outgrew them per patient         Past Surgical History:   Procedure Laterality Date    WISDOM TOOTH EXTRACTION         Review of Systems   Review of Systems    Patient Reports: Nausea and slight dizziness  Patient Denies: Nausea and vomiting, Spotting, Heavy bleeding, Cramping, and Fatigue  All systems reviewed and otherwise normal.    I have reviewed and agree with the HPI, ROS, and historical information as entered above. Radha Serrano MD      /66   Wt 69.9 kg (154 lb)   LMP 07/26/2023 (Exact Date)   BMI 22.74 kg/m²     The additional following portions of the patient's history were reviewed and updated as appropriate: allergies, current medications, past family history, past medical history, past social history, past surgical history, and problem list.    Physical Exam  General:  well developed; well nourished  no acute distress   Chest/Respiratory: No labored breathing, normal respiratory effort, normal appearance, no respiratory noises noted   Heart:  normal rate, regular rhythm,  no murmurs, rubs, or gallops   Thyroid: normal to inspection and palpation   Breasts:  Not performed.   Abdomen: soft, non-tender; no masses  no umbilical or inguinal hernias are present  no hepato-splenomegaly   Pelvis: Not performed.        Assessment and Plan    Problem List Items Addressed This Visit       History of macrosomia in infant in prior pregnancy, currently pregnant, unspecified trimester    Nausea without vomiting    Cystic fibrosis carrier    Multigravida in first trimester - Primary    Relevant Orders    Obstetric  Panel    HIV-1 / O / 2 Ag / Antibody    Urine Culture - Urine, Urine, Clean Catch    Urinalysis With Microscopic - Urine, Clean Catch    Chlamydia trachomatis, Neisseria gonorrhoeae, PCR - Urine, Urine, Clean Catch    Urine Drug Screen - Urine, Clean Catch       Pregnancy at 8w0d  Reviewed routine prenatal care with the office and educational materials given  Lab(s) Ordered  Discussed options for genetic testing including first trimester nuchal translucency screen, genetic disease carrier testing, quadruple screen, and NIPT  Discontinue the use of all non-medicinal drugs and chemicals  Nausea/Vomiting - she does not desire medications at this time.  Discussed conservative ways to help with nausea.  Patient is on Prenatal vitamins  Activity recommendation : 150 minutes/week of moderate intensity aerobic activity unless we limit for bleeding, hypertension or other pregnancy complication   Return in about 4 weeks (around 10/18/2023) for Next scheduled follow up.      Radha Serrano MD  09/20/2023

## 2023-09-21 LAB
ABO GROUP BLD: NORMAL
AMPHETAMINES UR QL SCN: NEGATIVE NG/ML
APPEARANCE UR: ABNORMAL
BACTERIA #/AREA URNS HPF: ABNORMAL /[HPF]
BARBITURATES UR QL SCN: NEGATIVE NG/ML
BASOPHILS # BLD AUTO: 0 X10E3/UL (ref 0–0.2)
BASOPHILS NFR BLD AUTO: 1 %
BENZODIAZ UR QL SCN: NEGATIVE NG/ML
BILIRUB UR QL STRIP: NEGATIVE
BLD GP AB SCN SERPL QL: NEGATIVE
BZE UR QL SCN: NEGATIVE NG/ML
CANNABINOIDS UR QL SCN: NEGATIVE NG/ML
CASTS URNS QL MICRO: ABNORMAL /LPF
COLOR UR: YELLOW
CREAT UR-MCNC: 180.6 MG/DL (ref 20–300)
CRYSTALS URNS MICRO: ABNORMAL
EOSINOPHIL # BLD AUTO: 0 X10E3/UL (ref 0–0.4)
EOSINOPHIL NFR BLD AUTO: 1 %
EPI CELLS #/AREA URNS HPF: ABNORMAL /HPF (ref 0–10)
ERYTHROCYTE [DISTWIDTH] IN BLOOD BY AUTOMATED COUNT: 11.9 % (ref 11.7–15.4)
GLUCOSE UR QL STRIP: ABNORMAL
HBV SURFACE AG SERPL QL IA: NEGATIVE
HCT VFR BLD AUTO: 40.9 % (ref 34–46.6)
HCV IGG SERPL QL IA: NON REACTIVE
HGB BLD-MCNC: 13.7 G/DL (ref 11.1–15.9)
HGB UR QL STRIP: NEGATIVE
HIV 1+2 AB+HIV1 P24 AG SERPL QL IA: NON REACTIVE
IMM GRANULOCYTES # BLD AUTO: 0 X10E3/UL (ref 0–0.1)
IMM GRANULOCYTES NFR BLD AUTO: 0 %
KETONES UR QL STRIP: NEGATIVE
LABORATORY COMMENT REPORT: NORMAL
LEUKOCYTE ESTERASE UR QL STRIP: NEGATIVE
LYMPHOCYTES # BLD AUTO: 0.7 X10E3/UL (ref 0.7–3.1)
LYMPHOCYTES NFR BLD AUTO: 13 %
MCH RBC QN AUTO: 30 PG (ref 26.6–33)
MCHC RBC AUTO-ENTMCNC: 33.5 G/DL (ref 31.5–35.7)
MCV RBC AUTO: 90 FL (ref 79–97)
METHADONE UR QL SCN: NEGATIVE NG/ML
MICRO URNS: ABNORMAL
MONOCYTES # BLD AUTO: 0.4 X10E3/UL (ref 0.1–0.9)
MONOCYTES NFR BLD AUTO: 7 %
NEUTROPHILS # BLD AUTO: 4.3 X10E3/UL (ref 1.4–7)
NEUTROPHILS NFR BLD AUTO: 78 %
NITRITE UR QL STRIP: NEGATIVE
OPIATES UR QL SCN: NEGATIVE NG/ML
OXYCODONE+OXYMORPHONE UR QL SCN: NEGATIVE NG/ML
PCP UR QL: NEGATIVE NG/ML
PH UR STRIP: 8 [PH] (ref 5–7.5)
PH UR: 7.7 [PH] (ref 4.5–8.9)
PLATELET # BLD AUTO: 181 X10E3/UL (ref 150–450)
PROPOXYPH UR QL SCN: NEGATIVE NG/ML
PROT UR QL STRIP: ABNORMAL
RBC # BLD AUTO: 4.56 X10E6/UL (ref 3.77–5.28)
RBC #/AREA URNS HPF: ABNORMAL /HPF (ref 0–2)
RH BLD: POSITIVE
RPR SER QL: NON REACTIVE
RUBV IGG SERPL IA-ACNC: 3.67 INDEX
SP GR UR STRIP: 1.02 (ref 1–1.03)
UNIDENT CRYS URNS QL MICRO: PRESENT
UROBILINOGEN UR STRIP-MCNC: 0.2 MG/DL (ref 0.2–1)
WBC # BLD AUTO: 5.5 X10E3/UL (ref 3.4–10.8)
WBC #/AREA URNS HPF: ABNORMAL /HPF (ref 0–5)

## 2023-09-22 LAB
BACTERIA UR CULT: NORMAL
BACTERIA UR CULT: NORMAL
C TRACH RRNA SPEC QL NAA+PROBE: NEGATIVE
N GONORRHOEA RRNA SPEC QL NAA+PROBE: NEGATIVE

## 2023-10-02 ENCOUNTER — TELEPHONE (OUTPATIENT)
Dept: OBSTETRICS AND GYNECOLOGY | Facility: CLINIC | Age: 33
End: 2023-10-02
Payer: COMMERCIAL

## 2023-10-02 DIAGNOSIS — O21.9 NAUSEA AND VOMITING IN PREGNANCY PRIOR TO 22 WEEKS GESTATION: Primary | ICD-10-CM

## 2023-10-02 RX ORDER — PROMETHAZINE HYDROCHLORIDE 25 MG/1
25 TABLET ORAL EVERY 6 HOURS PRN
Qty: 30 TABLET | Refills: 0 | Status: SHIPPED | OUTPATIENT
Start: 2023-10-02

## 2023-10-02 NOTE — TELEPHONE ENCOUNTER
"Pt stated she is suffering from severe nausea   Stated she has used the unisom and B6 stated its not helping at all stated it is making her sicker  Stated she has held some things down   Stated she feels like she has lost weight but she cannot check as scale isn't working. Stated she even with holding some things down she feels like she's not dehydrated but feels \"sleepy\" stated no changes to the color of her urine   Stated at last appt Dr Serrano had mentioned an Rx and she would like to try it as nothing else is working  "

## 2023-10-12 ENCOUNTER — LAB (OUTPATIENT)
Dept: OBSTETRICS AND GYNECOLOGY | Facility: CLINIC | Age: 33
End: 2023-10-12
Payer: COMMERCIAL

## 2023-10-12 DIAGNOSIS — Z34.81 MULTIGRAVIDA IN FIRST TRIMESTER: Primary | ICD-10-CM

## 2023-10-25 ENCOUNTER — ROUTINE PRENATAL (OUTPATIENT)
Dept: OBSTETRICS AND GYNECOLOGY | Facility: CLINIC | Age: 33
End: 2023-10-25
Payer: COMMERCIAL

## 2023-10-25 VITALS — WEIGHT: 157.2 LBS | BODY MASS INDEX: 23.21 KG/M2 | SYSTOLIC BLOOD PRESSURE: 120 MMHG | DIASTOLIC BLOOD PRESSURE: 76 MMHG

## 2023-10-25 DIAGNOSIS — Z34.82 MULTIGRAVIDA IN SECOND TRIMESTER: Primary | ICD-10-CM

## 2023-10-25 DIAGNOSIS — Z14.1 CYSTIC FIBROSIS CARRIER: ICD-10-CM

## 2023-10-25 DIAGNOSIS — O09.299 HISTORY OF MACROSOMIA IN INFANT IN PRIOR PREGNANCY, CURRENTLY PREGNANT, UNSPECIFIED TRIMESTER: ICD-10-CM
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
## 2023-11-20 ENCOUNTER — ROUTINE PRENATAL (OUTPATIENT)
Dept: OBSTETRICS AND GYNECOLOGY | Facility: CLINIC | Age: 33
End: 2023-11-20
Payer: COMMERCIAL

## 2023-11-20 VITALS — SYSTOLIC BLOOD PRESSURE: 114 MMHG | DIASTOLIC BLOOD PRESSURE: 62 MMHG | WEIGHT: 161.2 LBS | BODY MASS INDEX: 23.81 KG/M2

## 2023-11-20 DIAGNOSIS — R11.0 NAUSEA WITHOUT VOMITING: ICD-10-CM

## 2023-11-20 DIAGNOSIS — Z34.92 PRENATAL CARE IN SECOND TRIMESTER: Primary | ICD-10-CM

## 2023-11-20 LAB
GLUCOSE UR STRIP-MCNC: NEGATIVE MG/DL
PROT UR STRIP-MCNC: NEGATIVE MG/DL

## 2023-11-20 RX ORDER — ACETAMINOPHEN 500 MG
500 TABLET ORAL EVERY 6 HOURS PRN
COMMUNITY

## 2023-11-20 NOTE — PROGRESS NOTES
"      OB FOLLOW UP  CC- Here for care of pregnancy        Mady Brown is a 33 y.o.  16w5d patient being seen today for her obstetrical follow up visit. Patient reports headache (That is relieved by Tylenol or rest), visual changes (That is not accompanied with a headache), heartburn (She is not taking OTC medication for treatment currently), nausea, vomiting, and round ligament pain. Patient states that she had a stomach bug last Wednesday. Patient states that she has had a headache, dizziness, and fatigue since having the stomach bug. Patient states that she has been taking Tylenol PRN and this helps.     Patient also states that she almost passed out 1 month ago. Patient states that when she stood up she began having blurry vision and then \"everything went black\". Patient states that her head began feeling very warm and had numbness/tingling in both arms and hands. Patient states that she sat down in \"nelson-[place] applesauce\" until her vision returned to normal. Patient denies losing consciousness. Patient states that she has had these episodes during her childhood and has not had one since 1 month ago. Patient states that her parents are going through a divorce after 33 years of marriage. Patient states that her therapist believes that these episodes from childhood and current are brought on by stress and an emotional response to stress.      Her prenatal care is complicated by (and status) :   Patient Active Problem List   Diagnosis    History of macrosomia in infant in prior pregnancy, currently pregnant, unspecified trimester    Pregnant    Nausea without vomiting    Cystic fibrosis carrier    Multigravida in first trimester    Multigravida in second trimester    Prenatal care in second trimester       Flu Status: Already given in current flu season  Ultrasound Today: No    AFP: declines    ROS -   Patient Reports :  see above  Patient Denies: Loss of Fluid, Vaginal Spotting, Contractions, and " Epigastric pain  Fetal Movement : N/A  All other systems reviewed and are negative.       The additional following portions of the patient's history were reviewed and updated as appropriate: allergies and current medications.      I have reviewed and agree with the HPI, ROS, and historical information as entered above. Radha Serrano MD          EXAM:     Prenatal Vitals  BP: 114/62  Weight: 73.1 kg (161 lb 3.2 oz)       Pelvic Exam:        Urine Glucose Read-only: Negative  Urine Protein Read-only: Negative           Assessment and Plan    Problem List Items Addressed This Visit       Nausea without vomiting    Prenatal care in second trimester - Primary    Relevant Orders    POC Urinalysis Dipstick (Completed)       Pregnancy at 16w5d  Fetal status reassuring.   Counseled on MSAFP alone in relation to OTD and placental issues.    Anatomy scan next visit.   Activity and Exercise discussed.  Encouraged hydration, electrolytes and protein.  Patient is on Prenatal vitamins  Declines msAFP  Return in about 3 weeks (around 12/11/2023) for WITH SONO.    Radha Serrano MD  11/20/2023

## 2024-01-18 ENCOUNTER — LAB (OUTPATIENT)
Dept: LAB | Facility: HOSPITAL | Age: 34
End: 2024-01-18
Payer: COMMERCIAL

## 2024-01-18 ENCOUNTER — TRANSCRIBE ORDERS (OUTPATIENT)
Dept: LAB | Facility: HOSPITAL | Age: 34
End: 2024-01-18
Payer: COMMERCIAL

## 2024-01-18 DIAGNOSIS — Z3A.20 20 WEEKS GESTATION OF PREGNANCY: ICD-10-CM

## 2024-01-18 DIAGNOSIS — Z34.82 PRENATAL CARE, SUBSEQUENT PREGNANCY, SECOND TRIMESTER: ICD-10-CM

## 2024-01-18 DIAGNOSIS — Z34.82 PRENATAL CARE, SUBSEQUENT PREGNANCY, SECOND TRIMESTER: Primary | ICD-10-CM

## 2024-01-18 LAB — GLUCOSE 1H P 100 G GLC PO SERPL-MCNC: 108 MG/DL (ref 65–139)

## 2024-01-18 PROCEDURE — 82948 REAGENT STRIP/BLOOD GLUCOSE: CPT | Performed by: OBSTETRICS & GYNECOLOGY

## 2024-01-18 PROCEDURE — 36415 COLL VENOUS BLD VENIPUNCTURE: CPT

## 2024-01-18 PROCEDURE — 82950 GLUCOSE TEST: CPT

## 2024-03-11 ENCOUNTER — LAB (OUTPATIENT)
Dept: LAB | Facility: HOSPITAL | Age: 34
End: 2024-03-11
Payer: COMMERCIAL

## 2024-03-11 ENCOUNTER — TRANSCRIBE ORDERS (OUTPATIENT)
Dept: LAB | Facility: HOSPITAL | Age: 34
End: 2024-03-11
Payer: COMMERCIAL

## 2024-03-11 DIAGNOSIS — O09.893 HISTORY OF MATERNAL HEMATOMA OF BROAD LIGAMENT, CURRENTLY PREGNANT, THIRD TRIMESTER: ICD-10-CM

## 2024-03-11 DIAGNOSIS — O09.893 HISTORY OF MATERNAL HEMATOMA OF BROAD LIGAMENT, CURRENTLY PREGNANT, THIRD TRIMESTER: Primary | ICD-10-CM

## 2024-03-11 DIAGNOSIS — O36.5990 POOR FETAL GROWTH, AFFECTING MANAGEMENT OF MOTHER, DELIVERED: ICD-10-CM

## 2024-03-11 LAB
BLD GP AB SCN SERPL QL: NEGATIVE
DEPRECATED RDW RBC AUTO: 44.8 FL (ref 37–54)
ERYTHROCYTE [DISTWIDTH] IN BLOOD BY AUTOMATED COUNT: 13.2 % (ref 12.3–15.4)
HCT VFR BLD AUTO: 35.6 % (ref 34–46.6)
HGB BLD-MCNC: 12.1 G/DL (ref 12–15.9)
MCH RBC QN AUTO: 31.7 PG (ref 26.6–33)
MCHC RBC AUTO-ENTMCNC: 34 G/DL (ref 31.5–35.7)
MCV RBC AUTO: 93.2 FL (ref 79–97)
PLATELET # BLD AUTO: 159 10*3/MM3 (ref 140–450)
PMV BLD AUTO: 11.5 FL (ref 6–12)
RBC # BLD AUTO: 3.82 10*6/MM3 (ref 3.77–5.28)
WBC NRBC COR # BLD AUTO: 5.95 10*3/MM3 (ref 3.4–10.8)

## 2024-03-11 PROCEDURE — 86850 RBC ANTIBODY SCREEN: CPT

## 2024-03-11 PROCEDURE — 86780 TREPONEMA PALLIDUM: CPT

## 2024-03-11 PROCEDURE — 36415 COLL VENOUS BLD VENIPUNCTURE: CPT

## 2024-03-11 PROCEDURE — 85027 COMPLETE CBC AUTOMATED: CPT

## 2024-03-14 LAB — TREPONEMA PALLIDUM IGG+IGM AB [PRESENCE] IN SERUM OR PLASMA BY IMMUNOASSAY: NON REACTIVE

## 2024-03-29 LAB — EXTERNAL GROUP B STREP ANTIGEN: NEGATIVE

## 2024-04-02 ENCOUNTER — HOSPITAL ENCOUNTER (OUTPATIENT)
Facility: HOSPITAL | Age: 34
Discharge: HOME | End: 2024-04-02
Attending: OBSTETRICS & GYNECOLOGY | Admitting: OBSTETRICS & GYNECOLOGY
Payer: COMMERCIAL

## 2024-04-02 VITALS
RESPIRATION RATE: 17 BRPM | WEIGHT: 185 LBS | HEIGHT: 69 IN | HEART RATE: 94 BPM | SYSTOLIC BLOOD PRESSURE: 125 MMHG | OXYGEN SATURATION: 98 % | BODY MASS INDEX: 27.4 KG/M2 | DIASTOLIC BLOOD PRESSURE: 72 MMHG | TEMPERATURE: 98.4 F

## 2024-04-02 PROBLEM — Z34.93 THIRD TRIMESTER PREGNANCY: Status: ACTIVE | Noted: 2024-04-02

## 2024-04-02 LAB
BILIRUB UR QL STRIP: NEGATIVE
CLARITY UR: CLEAR
COLOR UR: YELLOW
GLUCOSE UR STRIP-MCNC: ABNORMAL MG/DL
HGB UR QL STRIP.AUTO: NEGATIVE
KETONES UR QL STRIP: NEGATIVE
LEUKOCYTE ESTERASE UR QL STRIP.AUTO: NEGATIVE
NITRITE UR QL STRIP: NEGATIVE
PH UR STRIP.AUTO: 6.5 [PH] (ref 5–8)
PROT UR QL STRIP: NEGATIVE
SP GR UR STRIP: 1.02 (ref 1–1.03)
UROBILINOGEN UR QL STRIP: ABNORMAL

## 2024-04-02 PROCEDURE — G0378 HOSPITAL OBSERVATION PER HR: HCPCS

## 2024-04-02 PROCEDURE — 81003 URINALYSIS AUTO W/O SCOPE: CPT | Performed by: OBSTETRICS & GYNECOLOGY

## 2024-04-02 PROCEDURE — 59025 FETAL NON-STRESS TEST: CPT

## 2024-04-02 PROCEDURE — G0463 HOSPITAL OUTPT CLINIC VISIT: HCPCS

## 2024-04-02 PROCEDURE — 99221 1ST HOSP IP/OBS SF/LOW 40: CPT | Performed by: OBSTETRICS & GYNECOLOGY

## 2024-04-02 PROCEDURE — 59025 FETAL NON-STRESS TEST: CPT | Performed by: OBSTETRICS & GYNECOLOGY

## 2024-04-02 RX ORDER — ASPIRIN 81 MG/1
81 TABLET, CHEWABLE ORAL DAILY
COMMUNITY

## 2024-04-02 NOTE — H&P
"[facility]  Obstetric History and Physical    Referring Provider: Susan Ferrara MD      Chief Complaint   Patient presents with    Contractions              Subjective     Patient is a 33 y.o. female  currently at 35w6d, who presents with complaint of contractions.  Patient reports mild menstrual type lower abdominal cramping without leaking fluid or vaginal bleeding.  Patient denies urinary symptoms, recent trauma, fever, reports normal fetal activity.   course uncomplicated to date.        The following portions of the patients history were reviewed and updated as appropriate: current medications, allergies, past medical history, past surgical history, past family history, past social history, and problem list .       Prenatal Information:   Maternal Prenatal Labs  Blood Type No results found for: \"ABO\"   Rh Status No results found for: \"RH\"   Antibody Screen No results found for: \"ABSCRN\"   Gonnorhea No results found for: \"GCCX\"   Chlamydia No results found for: \"CLAMYDCU\"   RPR No results found for: \"RPR\"   Syphilis Antibody No results found for: \"SYPHILIS\"   Rubella No results found for: \"RUBELLAIGGIN\"   Hepatitis B Surface Antigen No results found for: \"HEPBSAG\"   HIV-1 Antibody No results found for: \"LABHIV1\"   Hepatitis C Antibody No results found for: \"HEPCAB\"   Rapid Urin Drug Screen No results found for: \"AMPMETHU\", \"BARBITSCNUR\", \"LABBENZSCN\", \"LABMETHSCN\", \"LABOPIASCN\", \"THCURSCR\", \"COCAINEUR\", \"AMPHETSCREEN\", \"PROPOXSCN\", \"BUPRENORSCNU\", \"METAMPSCNUR\", \"OXYCODONESCN\", \"TRICYCLICSCN\"   Group B Strep Culture No results found for: \"GBSANTIGEN\"           External Prenatal Results       Pregnancy Outside Results - Transcribed From Office Records - See Scanned Records For Details       Test Value Date Time    ABO  A  23 0950    Rh  Positive  23 0950    Antibody Screen  Negative  24 1559       Negative  23 0950    Varicella IgG       Rubella  3.67 index 23 " 0950    Hgb  12.1 g/dL 24 1559       13.7 g/dL 23 0950    Hct  35.6 % 24 1559       40.9 % 23 0950    Glucose Fasting GTT       Glucose Tolerance Test 1 hour       Glucose Tolerance Test 3 hour       Gonorrhea (discrete)  Negative  23 0950    Chlamydia (discrete)  Negative  23 0950    RPR  Non Reactive  23 0950    VDRL       Syphilis Antibody       HBsAg  Negative  23 0950    Herpes Simplex Virus PCR       Herpes Simplex VIrus Culture       HIV  Non Reactive  23 0950    Hep C RNA Quant PCR       Hep C Antibody  Non Reactive  23 0950    AFP       Group B Strep  No Group B Streptococcus isolated  10/25/21 1328    GBS Susceptibility to Clindamycin       GBS Susceptibility to Erythromycin       Fetal Fibronectin       Genetic Testing, Maternal Blood                 Drug Screening       Test Value Date Time    Urine Drug Screen       Amphetamine Screen  Negative ng/mL 23 0950    Barbiturate Screen  Negative ng/mL 23 0950    Benzodiazepine Screen  Negative ng/mL 23 0950    Methadone Screen  Negative ng/mL 23 0950    Phencyclidine Screen  Negative ng/mL 23 0950    Opiates Screen       THC Screen       Cocaine Screen       Propoxyphene Screen  Negative ng/mL 23 0950    Buprenorphine Screen       Methamphetamine Screen       Oxycodone Screen       Tricyclic Antidepressants Screen                 Legend    ^: Historical                              Past OB History:       OB History    Para Term  AB Living   3 2 2 0 0 2   SAB IAB Ectopic Molar Multiple Live Births   0 0 0 0 0 2      # Outcome Date GA Lbr Roberto/2nd Weight Sex Type Anes PTL Lv   3 Current            2 Term 21 39w5d 16:16 / 00:13 4060 g (8 lb 15.2 oz) M Vag-Spont EPI N LAI      Name: DOV JOYCE      Apgar1: 8  Apgar5: 9   1 Term 20 40w1d  4082 g (9 lb) M Vag-Spont EPI  LAI       Past Medical History: Past Medical History:   Diagnosis  Date    Cystic fibrosis carrier     Ovarian cyst     Seizures     in childhood, outgrew them per patient       Past Surgical History Past Surgical History:   Procedure Laterality Date    WISDOM TOOTH EXTRACTION        Family History: Family History   Problem Relation Age of Onset    Stroke Maternal Grandfather     Cystic fibrosis Son         [id number] mutation      Social History:  reports that she has never smoked. She has never used smokeless tobacco.   reports no history of alcohol use.   reports no history of drug use.                   General ROS Negative Findings:Headaches, Visual Changes, Epigastric pain, Anorexia, Nausia/Vomiting, ROM, and Vaginal Bleeding    ROS     All other systems have been reviewed and are neg  Objective       Vital Signs Range for the last 24 hours  Temperature: Temp:  [98.4 °F (36.9 °C)] 98.4 °F (36.9 °C)   Temp Source: Temp src: Oral   BP: BP: (125)/(72) 125/72   Pulse: Heart Rate:  [94] 94   Respirations: Resp:  [17] 17   SPO2: SpO2:  [98 %] 98 %   O2 Amount (l/min):     O2 Devices     Weight: Weight:  [83.9 kg (185 lb)] 83.9 kg (185 lb)     Physical Examination:   General:   alert, appears stated age, and cooperative   Skin:   normal   HEENT:     Lungs:      Heart:      Gastrointestinal: Abdomen soft, gravid uterus, guarding benign exam   Lower Extremities: No edema, no calf tenderness   :    Musculoskeletal:     Neuro: No focal deficits noted.         Presentation: vtx   Cervix: Exam by:  RN   Dilation:  1cm   Effacement:     Station:         Fetal Heart Rate Assessment   Method:     Beats/min:     Baseline:     Varibility:     Accels:     Decels:     Tracing Category:     NST-indications contractions, interpretation reactive, moderate bili, accelerations present 15 x 15, no fetal deceleration noted, onset 1548, end time 1650, no regular contractions noted.  Uterine Assessment   Method:     Frequency (min):     Ctx Count in 10 min:     Duration:     Intensity:     Intensity by  IUPC:     Resting Tone:     Resting Tone by IUPC:     Austell Units:       Laboratory Results:   Lab Results (last 24 hours)       Procedure Component Value Units Date/Time    Urinalysis With Microscopic If Indicated (No Culture) - Urine, Clean Catch [[id number]]  (Abnormal) Collected: 04/02/24 1606    Specimen: Urine, Clean Catch Updated: 04/02/24 1620     Color, UA Yellow     Appearance, UA Clear     pH, UA 6.5     Specific Gravity, UA 1.022     Glucose, UA >=1000 mg/dL (3+)     Ketones, UA Negative     Bilirubin, UA Negative     Blood, UA Negative     Protein, UA Negative     Leuk Esterase, UA Negative     Nitrite, UA Negative     Urobilinogen, UA 0.2 E.U./dL    Narrative:      Urine microscopic not indicated.          Radiology Review:   Imaging Results (Last 24 Hours)       ** No results found for the last 24 hours. **          Other Studies: Urinalysis    Assessment & Plan       Third trimester pregnancy        Assessment:  1.  Intrauterine pregnancy at 35w6d weeks gestation with reactive fetal status.    2.  False labor   3.  Urinalysis revealed 3+ glucosuria however patient reports having candy prior to arrival.  4.      Plan:  1.  Discharge to home, kick count, labor instruction given, follow-up OB provider routine or as needed  2. Plan of care has been reviewed with patient.  3.  Risks, benefits of treatment plan have been discussed.  4.  All questions have been answered.  5      Yoel Jang DO  4/2/2024  16:57 EDT

## 2024-04-18 ENCOUNTER — PREP FOR SURGERY (OUTPATIENT)
Dept: OTHER | Facility: HOSPITAL | Age: 34
End: 2024-04-18
Payer: COMMERCIAL

## 2024-04-18 DIAGNOSIS — O36.5931 IUGR (INTRAUTERINE GROWTH RESTRICTION) AFFECTING CARE OF MOTHER, THIRD TRIMESTER, FETUS 1: ICD-10-CM

## 2024-04-18 DIAGNOSIS — Z3A.38 38 WEEKS GESTATION OF PREGNANCY: Primary | ICD-10-CM

## 2024-04-18 RX ORDER — OXYTOCIN/0.9 % SODIUM CHLORIDE 30/500 ML
30 PLASTIC BAG, INJECTION (ML) INTRAVENOUS ONCE
Status: CANCELLED | OUTPATIENT
Start: 2024-04-18 | End: 2024-04-18

## 2024-04-18 RX ORDER — TERBUTALINE SULFATE 1 MG/ML
0.25 INJECTION, SOLUTION SUBCUTANEOUS AS NEEDED
Status: CANCELLED | OUTPATIENT
Start: 2024-04-18

## 2024-04-18 RX ORDER — ACETAMINOPHEN 325 MG/1
650 TABLET ORAL EVERY 4 HOURS PRN
Status: CANCELLED | OUTPATIENT
Start: 2024-04-18

## 2024-04-18 RX ORDER — SODIUM CHLORIDE 0.9 % (FLUSH) 0.9 %
10 SYRINGE (ML) INJECTION EVERY 12 HOURS SCHEDULED
Status: CANCELLED | OUTPATIENT
Start: 2024-04-18

## 2024-04-18 RX ORDER — ONDANSETRON 4 MG/1
4 TABLET, ORALLY DISINTEGRATING ORAL EVERY 6 HOURS PRN
Status: CANCELLED | OUTPATIENT
Start: 2024-04-18

## 2024-04-18 RX ORDER — PROMETHAZINE HYDROCHLORIDE 12.5 MG/1
12.5 SUPPOSITORY RECTAL EVERY 6 HOURS PRN
Status: CANCELLED | OUTPATIENT
Start: 2024-04-18

## 2024-04-18 RX ORDER — NALOXONE HCL 0.4 MG/ML
0.4 VIAL (ML) INJECTION
Status: CANCELLED | OUTPATIENT
Start: 2024-04-18

## 2024-04-18 RX ORDER — PROMETHAZINE HYDROCHLORIDE 12.5 MG/1
12.5 TABLET ORAL EVERY 6 HOURS PRN
Status: CANCELLED | OUTPATIENT
Start: 2024-04-18

## 2024-04-18 RX ORDER — SODIUM CHLORIDE, SODIUM LACTATE, POTASSIUM CHLORIDE, CALCIUM CHLORIDE 600; 310; 30; 20 MG/100ML; MG/100ML; MG/100ML; MG/100ML
125 INJECTION, SOLUTION INTRAVENOUS CONTINUOUS
Status: CANCELLED | OUTPATIENT
Start: 2024-04-18

## 2024-04-18 RX ORDER — MISOPROSTOL 200 UG/1
800 TABLET ORAL AS NEEDED
Status: CANCELLED | OUTPATIENT
Start: 2024-04-18

## 2024-04-18 RX ORDER — MAGNESIUM CARB/ALUMINUM HYDROX 105-160MG
30 TABLET,CHEWABLE ORAL ONCE
Status: CANCELLED | OUTPATIENT
Start: 2024-04-18 | End: 2024-04-18

## 2024-04-18 RX ORDER — ONDANSETRON 2 MG/ML
4 INJECTION INTRAMUSCULAR; INTRAVENOUS EVERY 6 HOURS PRN
Status: CANCELLED | OUTPATIENT
Start: 2024-04-18

## 2024-04-18 RX ORDER — METHYLERGONOVINE MALEATE 0.2 MG/ML
200 INJECTION INTRAVENOUS ONCE AS NEEDED
Status: CANCELLED | OUTPATIENT
Start: 2024-04-18

## 2024-04-18 RX ORDER — SODIUM CHLORIDE 9 MG/ML
40 INJECTION, SOLUTION INTRAVENOUS AS NEEDED
Status: CANCELLED | OUTPATIENT
Start: 2024-04-18

## 2024-04-18 RX ORDER — CARBOPROST TROMETHAMINE 250 UG/ML
250 INJECTION, SOLUTION INTRAMUSCULAR AS NEEDED
Status: CANCELLED | OUTPATIENT
Start: 2024-04-18

## 2024-04-18 RX ORDER — OXYTOCIN/0.9 % SODIUM CHLORIDE 30/500 ML
30 PLASTIC BAG, INJECTION (ML) INTRAVENOUS CONTINUOUS
Status: CANCELLED | OUTPATIENT
Start: 2024-04-18 | End: 2024-04-18

## 2024-04-18 RX ORDER — MORPHINE SULFATE 1 MG/ML
1 INJECTION, SOLUTION EPIDURAL; INTRATHECAL; INTRAVENOUS EVERY 4 HOURS PRN
Status: CANCELLED | OUTPATIENT
Start: 2024-04-18 | End: 2024-04-23

## 2024-04-18 RX ORDER — LIDOCAINE HYDROCHLORIDE 10 MG/ML
0.5 INJECTION, SOLUTION EPIDURAL; INFILTRATION; INTRACAUDAL; PERINEURAL ONCE AS NEEDED
Status: CANCELLED | OUTPATIENT
Start: 2024-04-18

## 2024-04-18 RX ORDER — SODIUM CHLORIDE 0.9 % (FLUSH) 0.9 %
10 SYRINGE (ML) INJECTION AS NEEDED
Status: CANCELLED | OUTPATIENT
Start: 2024-04-18

## 2024-04-18 RX ORDER — MORPHINE SULFATE 2 MG/ML
2 INJECTION, SOLUTION INTRAMUSCULAR; INTRAVENOUS EVERY 4 HOURS PRN
Status: CANCELLED | OUTPATIENT
Start: 2024-04-18 | End: 2024-04-23

## 2024-04-19 ENCOUNTER — HOSPITAL ENCOUNTER (INPATIENT)
Facility: HOSPITAL | Age: 34
LOS: 2 days | Discharge: HOME | End: 2024-04-21
Attending: OBSTETRICS & GYNECOLOGY | Admitting: STUDENT IN AN ORGANIZED HEALTH CARE EDUCATION/TRAINING PROGRAM
Payer: COMMERCIAL

## 2024-04-19 ENCOUNTER — ANESTHESIA (OUTPATIENT)
Dept: LABOR AND DELIVERY | Facility: HOSPITAL | Age: 34
End: 2024-04-19
Payer: COMMERCIAL

## 2024-04-19 ENCOUNTER — ANESTHESIA EVENT (OUTPATIENT)
Dept: LABOR AND DELIVERY | Facility: HOSPITAL | Age: 34
End: 2024-04-19
Payer: COMMERCIAL

## 2024-04-19 DIAGNOSIS — Z3A.38 38 WEEKS GESTATION OF PREGNANCY: ICD-10-CM

## 2024-04-19 DIAGNOSIS — O36.5931 IUGR (INTRAUTERINE GROWTH RESTRICTION) AFFECTING CARE OF MOTHER, THIRD TRIMESTER, FETUS 1: ICD-10-CM

## 2024-04-19 PROBLEM — O36.5990 IUGR (INTRAUTERINE GROWTH RESTRICTION) AFFECTING CARE OF MOTHER: Status: ACTIVE | Noted: 2024-04-19

## 2024-04-19 LAB
ABO GROUP BLD: NORMAL
ALP SERPL-CCNC: 156 U/L (ref 39–117)
ALT SERPL W P-5'-P-CCNC: 7 U/L (ref 1–33)
AST SERPL-CCNC: 19 U/L (ref 1–32)
BILIRUB SERPL-MCNC: 0.3 MG/DL (ref 0–1.2)
BLD GP AB SCN SERPL QL: NEGATIVE
CREAT SERPL-MCNC: 0.49 MG/DL (ref 0.57–1)
DEPRECATED RDW RBC AUTO: 46.1 FL (ref 37–54)
ERYTHROCYTE [DISTWIDTH] IN BLOOD BY AUTOMATED COUNT: 13.6 % (ref 12.3–15.4)
HCT VFR BLD AUTO: 40 % (ref 34–46.6)
HGB BLD-MCNC: 13.4 G/DL (ref 12–15.9)
LDH SERPL-CCNC: 235 U/L (ref 135–214)
MCH RBC QN AUTO: 31.3 PG (ref 26.6–33)
MCHC RBC AUTO-ENTMCNC: 33.5 G/DL (ref 31.5–35.7)
MCV RBC AUTO: 93.5 FL (ref 79–97)
PLATELET # BLD AUTO: 156 10*3/MM3 (ref 140–450)
PMV BLD AUTO: 10.9 FL (ref 6–12)
RBC # BLD AUTO: 4.28 10*6/MM3 (ref 3.77–5.28)
RH BLD: POSITIVE
T PALLIDUM IGG SER QL: NORMAL
T&S EXPIRATION DATE: NORMAL
URATE SERPL-MCNC: 3 MG/DL (ref 2.4–5.7)
WBC NRBC COR # BLD AUTO: 7.03 10*3/MM3 (ref 3.4–10.8)

## 2024-04-19 PROCEDURE — 25010000002 FENTANYL CITRATE (PF) 50 MCG/ML SOLUTION: Performed by: NURSE ANESTHETIST, CERTIFIED REGISTERED

## 2024-04-19 PROCEDURE — 51702 INSERT TEMP BLADDER CATH: CPT

## 2024-04-19 PROCEDURE — 86850 RBC ANTIBODY SCREEN: CPT | Performed by: STUDENT IN AN ORGANIZED HEALTH CARE EDUCATION/TRAINING PROGRAM

## 2024-04-19 PROCEDURE — 82247 BILIRUBIN TOTAL: CPT | Performed by: OBSTETRICS & GYNECOLOGY

## 2024-04-19 PROCEDURE — 84460 ALANINE AMINO (ALT) (SGPT): CPT | Performed by: OBSTETRICS & GYNECOLOGY

## 2024-04-19 PROCEDURE — 88307 TISSUE EXAM BY PATHOLOGIST: CPT | Performed by: STUDENT IN AN ORGANIZED HEALTH CARE EDUCATION/TRAINING PROGRAM

## 2024-04-19 PROCEDURE — 83615 LACTATE (LD) (LDH) ENZYME: CPT | Performed by: OBSTETRICS & GYNECOLOGY

## 2024-04-19 PROCEDURE — 25810000003 LACTATED RINGERS PER 1000 ML: Performed by: STUDENT IN AN ORGANIZED HEALTH CARE EDUCATION/TRAINING PROGRAM

## 2024-04-19 PROCEDURE — 84075 ASSAY ALKALINE PHOSPHATASE: CPT | Performed by: OBSTETRICS & GYNECOLOGY

## 2024-04-19 PROCEDURE — 25010000002 ONDANSETRON PER 1 MG: Performed by: NURSE ANESTHETIST, CERTIFIED REGISTERED

## 2024-04-19 PROCEDURE — 86900 BLOOD TYPING SEROLOGIC ABO: CPT | Performed by: STUDENT IN AN ORGANIZED HEALTH CARE EDUCATION/TRAINING PROGRAM

## 2024-04-19 PROCEDURE — 25010000002 ROPIVACAINE PER 1 MG: Performed by: NURSE ANESTHETIST, CERTIFIED REGISTERED

## 2024-04-19 PROCEDURE — C1755 CATHETER, INTRASPINAL: HCPCS | Performed by: ANESTHESIOLOGY

## 2024-04-19 PROCEDURE — 25010000002 MORPHINE PER 10 MG: Performed by: STUDENT IN AN ORGANIZED HEALTH CARE EDUCATION/TRAINING PROGRAM

## 2024-04-19 PROCEDURE — 82565 ASSAY OF CREATININE: CPT | Performed by: OBSTETRICS & GYNECOLOGY

## 2024-04-19 PROCEDURE — 0HQ9XZZ REPAIR PERINEUM SKIN, EXTERNAL APPROACH: ICD-10-PCS | Performed by: STUDENT IN AN ORGANIZED HEALTH CARE EDUCATION/TRAINING PROGRAM

## 2024-04-19 PROCEDURE — 86780 TREPONEMA PALLIDUM: CPT | Performed by: STUDENT IN AN ORGANIZED HEALTH CARE EDUCATION/TRAINING PROGRAM

## 2024-04-19 PROCEDURE — 85027 COMPLETE CBC AUTOMATED: CPT | Performed by: STUDENT IN AN ORGANIZED HEALTH CARE EDUCATION/TRAINING PROGRAM

## 2024-04-19 PROCEDURE — C1755 CATHETER, INTRASPINAL: HCPCS

## 2024-04-19 PROCEDURE — 84450 TRANSFERASE (AST) (SGOT): CPT | Performed by: OBSTETRICS & GYNECOLOGY

## 2024-04-19 PROCEDURE — 10907ZC DRAINAGE OF AMNIOTIC FLUID, THERAPEUTIC FROM PRODUCTS OF CONCEPTION, VIA NATURAL OR ARTIFICIAL OPENING: ICD-10-PCS | Performed by: STUDENT IN AN ORGANIZED HEALTH CARE EDUCATION/TRAINING PROGRAM

## 2024-04-19 PROCEDURE — 25810000003 LACTATED RINGERS SOLUTION: Performed by: STUDENT IN AN ORGANIZED HEALTH CARE EDUCATION/TRAINING PROGRAM

## 2024-04-19 PROCEDURE — 84550 ASSAY OF BLOOD/URIC ACID: CPT | Performed by: OBSTETRICS & GYNECOLOGY

## 2024-04-19 PROCEDURE — 86901 BLOOD TYPING SEROLOGIC RH(D): CPT | Performed by: STUDENT IN AN ORGANIZED HEALTH CARE EDUCATION/TRAINING PROGRAM

## 2024-04-19 PROCEDURE — 59025 FETAL NON-STRESS TEST: CPT

## 2024-04-19 RX ORDER — PROMETHAZINE HYDROCHLORIDE 12.5 MG/1
12.5 SUPPOSITORY RECTAL EVERY 6 HOURS PRN
Status: DISCONTINUED | OUTPATIENT
Start: 2024-04-19 | End: 2024-04-19 | Stop reason: HOSPADM

## 2024-04-19 RX ORDER — DIPHENHYDRAMINE HCL 25 MG
25 CAPSULE ORAL NIGHTLY PRN
Status: DISCONTINUED | OUTPATIENT
Start: 2024-04-19 | End: 2024-04-21 | Stop reason: HOSPADM

## 2024-04-19 RX ORDER — SIMETHICONE 80 MG
80 TABLET,CHEWABLE ORAL 4 TIMES DAILY PRN
Status: DISCONTINUED | OUTPATIENT
Start: 2024-04-19 | End: 2024-04-21 | Stop reason: HOSPADM

## 2024-04-19 RX ORDER — EPHEDRINE SULFATE 5 MG/ML
INJECTION INTRAVENOUS
Status: COMPLETED
Start: 2024-04-19 | End: 2024-04-19

## 2024-04-19 RX ORDER — MAGNESIUM CARB/ALUMINUM HYDROX 105-160MG
30 TABLET,CHEWABLE ORAL ONCE
Status: DISCONTINUED | OUTPATIENT
Start: 2024-04-19 | End: 2024-04-19 | Stop reason: HOSPADM

## 2024-04-19 RX ORDER — LIDOCAINE HCL/EPINEPHRINE/PF 2%-1:200K
VIAL (ML) INJECTION AS NEEDED
Status: DISCONTINUED | OUTPATIENT
Start: 2024-04-19 | End: 2024-04-19 | Stop reason: SURG

## 2024-04-19 RX ORDER — NALOXONE HCL 0.4 MG/ML
0.4 VIAL (ML) INJECTION
Status: DISCONTINUED | OUTPATIENT
Start: 2024-04-19 | End: 2024-04-19 | Stop reason: HOSPADM

## 2024-04-19 RX ORDER — METHYLERGONOVINE MALEATE 0.2 MG/ML
200 INJECTION INTRAVENOUS ONCE AS NEEDED
Status: DISCONTINUED | OUTPATIENT
Start: 2024-04-19 | End: 2024-04-21 | Stop reason: HOSPADM

## 2024-04-19 RX ORDER — OXYTOCIN/0.9 % SODIUM CHLORIDE 30/500 ML
999 PLASTIC BAG, INJECTION (ML) INTRAVENOUS ONCE
Status: DISCONTINUED | OUTPATIENT
Start: 2024-04-19 | End: 2024-04-19 | Stop reason: HOSPADM

## 2024-04-19 RX ORDER — HYDROCODONE BITARTRATE AND ACETAMINOPHEN 10; 325 MG/1; MG/1
1 TABLET ORAL EVERY 4 HOURS PRN
Status: DISCONTINUED | OUTPATIENT
Start: 2024-04-19 | End: 2024-04-21 | Stop reason: HOSPADM

## 2024-04-19 RX ORDER — MORPHINE SULFATE 2 MG/ML
2 INJECTION, SOLUTION INTRAMUSCULAR; INTRAVENOUS EVERY 4 HOURS PRN
Status: DISCONTINUED | OUTPATIENT
Start: 2024-04-19 | End: 2024-04-19 | Stop reason: HOSPADM

## 2024-04-19 RX ORDER — SODIUM CHLORIDE 0.9 % (FLUSH) 0.9 %
10 SYRINGE (ML) INJECTION AS NEEDED
Status: DISCONTINUED | OUTPATIENT
Start: 2024-04-19 | End: 2024-04-19 | Stop reason: HOSPADM

## 2024-04-19 RX ORDER — MISOPROSTOL 200 UG/1
800 TABLET ORAL AS NEEDED
Status: DISCONTINUED | OUTPATIENT
Start: 2024-04-19 | End: 2024-04-21 | Stop reason: HOSPADM

## 2024-04-19 RX ORDER — ONDANSETRON 4 MG/1
4 TABLET, ORALLY DISINTEGRATING ORAL EVERY 6 HOURS PRN
Status: DISCONTINUED | OUTPATIENT
Start: 2024-04-19 | End: 2024-04-19 | Stop reason: HOSPADM

## 2024-04-19 RX ORDER — CARBOPROST TROMETHAMINE 250 UG/ML
250 INJECTION, SOLUTION INTRAMUSCULAR AS NEEDED
Status: DISCONTINUED | OUTPATIENT
Start: 2024-04-19 | End: 2024-04-19 | Stop reason: HOSPADM

## 2024-04-19 RX ORDER — LIDOCAINE HYDROCHLORIDE AND EPINEPHRINE 15; 5 MG/ML; UG/ML
INJECTION, SOLUTION EPIDURAL AS NEEDED
Status: DISCONTINUED | OUTPATIENT
Start: 2024-04-19 | End: 2024-04-19 | Stop reason: SURG

## 2024-04-19 RX ORDER — ONDANSETRON 2 MG/ML
4 INJECTION INTRAMUSCULAR; INTRAVENOUS EVERY 6 HOURS PRN
Status: DISCONTINUED | OUTPATIENT
Start: 2024-04-19 | End: 2024-04-19 | Stop reason: HOSPADM

## 2024-04-19 RX ORDER — PRENATAL VIT/IRON FUM/FOLIC AC 27MG-0.8MG
1 TABLET ORAL DAILY
Status: DISCONTINUED | OUTPATIENT
Start: 2024-04-20 | End: 2024-04-21 | Stop reason: HOSPADM

## 2024-04-19 RX ORDER — FENTANYL CITRATE 50 UG/ML
INJECTION, SOLUTION INTRAMUSCULAR; INTRAVENOUS AS NEEDED
Status: DISCONTINUED | OUTPATIENT
Start: 2024-04-19 | End: 2024-04-19 | Stop reason: SURG

## 2024-04-19 RX ORDER — SODIUM CHLORIDE, SODIUM LACTATE, POTASSIUM CHLORIDE, CALCIUM CHLORIDE 600; 310; 30; 20 MG/100ML; MG/100ML; MG/100ML; MG/100ML
125 INJECTION, SOLUTION INTRAVENOUS CONTINUOUS
Status: DISCONTINUED | OUTPATIENT
Start: 2024-04-19 | End: 2024-04-21 | Stop reason: HOSPADM

## 2024-04-19 RX ORDER — CALCIUM CARBONATE 500 MG/1
1 TABLET, CHEWABLE ORAL 3 TIMES DAILY PRN
Status: DISCONTINUED | OUTPATIENT
Start: 2024-04-19 | End: 2024-04-21 | Stop reason: HOSPADM

## 2024-04-19 RX ORDER — ONDANSETRON 4 MG/1
4 TABLET, ORALLY DISINTEGRATING ORAL EVERY 8 HOURS PRN
Status: DISCONTINUED | OUTPATIENT
Start: 2024-04-19 | End: 2024-04-21 | Stop reason: HOSPADM

## 2024-04-19 RX ORDER — OXYTOCIN/0.9 % SODIUM CHLORIDE 30/500 ML
2-20 PLASTIC BAG, INJECTION (ML) INTRAVENOUS
Status: DISCONTINUED | OUTPATIENT
Start: 2024-04-19 | End: 2024-04-21 | Stop reason: HOSPADM

## 2024-04-19 RX ORDER — OXYTOCIN/0.9 % SODIUM CHLORIDE 30/500 ML
250 PLASTIC BAG, INJECTION (ML) INTRAVENOUS CONTINUOUS
Status: DISPENSED | OUTPATIENT
Start: 2024-04-19 | End: 2024-04-19

## 2024-04-19 RX ORDER — SODIUM CHLORIDE 9 MG/ML
40 INJECTION, SOLUTION INTRAVENOUS AS NEEDED
Status: DISCONTINUED | OUTPATIENT
Start: 2024-04-19 | End: 2024-04-19 | Stop reason: HOSPADM

## 2024-04-19 RX ORDER — SODIUM CHLORIDE 0.9 % (FLUSH) 0.9 %
1-10 SYRINGE (ML) INJECTION AS NEEDED
Status: DISCONTINUED | OUTPATIENT
Start: 2024-04-19 | End: 2024-04-21 | Stop reason: HOSPADM

## 2024-04-19 RX ORDER — PROMETHAZINE HYDROCHLORIDE 12.5 MG/1
12.5 TABLET ORAL EVERY 4 HOURS PRN
Status: DISCONTINUED | OUTPATIENT
Start: 2024-04-19 | End: 2024-04-21 | Stop reason: HOSPADM

## 2024-04-19 RX ORDER — MORPHINE SULFATE 2 MG/ML
1 INJECTION, SOLUTION INTRAMUSCULAR; INTRAVENOUS EVERY 4 HOURS PRN
Status: DISCONTINUED | OUTPATIENT
Start: 2024-04-19 | End: 2024-04-19 | Stop reason: HOSPADM

## 2024-04-19 RX ORDER — SODIUM CHLORIDE 0.9 % (FLUSH) 0.9 %
10 SYRINGE (ML) INJECTION EVERY 12 HOURS SCHEDULED
Status: DISCONTINUED | OUTPATIENT
Start: 2024-04-19 | End: 2024-04-19 | Stop reason: HOSPADM

## 2024-04-19 RX ORDER — ROPIVACAINE HYDROCHLORIDE 2 MG/ML
15 INJECTION, SOLUTION EPIDURAL; INFILTRATION; PERINEURAL CONTINUOUS
Status: DISCONTINUED | OUTPATIENT
Start: 2024-04-19 | End: 2024-04-21 | Stop reason: HOSPADM

## 2024-04-19 RX ORDER — DOCUSATE SODIUM 100 MG/1
100 CAPSULE, LIQUID FILLED ORAL 2 TIMES DAILY
Status: DISCONTINUED | OUTPATIENT
Start: 2024-04-19 | End: 2024-04-21 | Stop reason: HOSPADM

## 2024-04-19 RX ORDER — LIDOCAINE HYDROCHLORIDE 10 MG/ML
0.5 INJECTION, SOLUTION EPIDURAL; INFILTRATION; INTRACAUDAL; PERINEURAL ONCE AS NEEDED
Status: DISCONTINUED | OUTPATIENT
Start: 2024-04-19 | End: 2024-04-19 | Stop reason: HOSPADM

## 2024-04-19 RX ORDER — HYDROCORTISONE 25 MG/G
1 CREAM TOPICAL AS NEEDED
Status: DISCONTINUED | OUTPATIENT
Start: 2024-04-19 | End: 2024-04-21 | Stop reason: HOSPADM

## 2024-04-19 RX ORDER — CITRIC ACID/SODIUM CITRATE 334-500MG
30 SOLUTION, ORAL ORAL ONCE
Status: DISCONTINUED | OUTPATIENT
Start: 2024-04-19 | End: 2024-04-19 | Stop reason: HOSPADM

## 2024-04-19 RX ORDER — OXYTOCIN/0.9 % SODIUM CHLORIDE 30/500 ML
125 PLASTIC BAG, INJECTION (ML) INTRAVENOUS CONTINUOUS PRN
Status: DISCONTINUED | OUTPATIENT
Start: 2024-04-19 | End: 2024-04-21 | Stop reason: HOSPADM

## 2024-04-19 RX ORDER — PROMETHAZINE HYDROCHLORIDE 12.5 MG/1
12.5 TABLET ORAL EVERY 6 HOURS PRN
Status: DISCONTINUED | OUTPATIENT
Start: 2024-04-19 | End: 2024-04-19 | Stop reason: HOSPADM

## 2024-04-19 RX ORDER — IBUPROFEN 600 MG/1
600 TABLET ORAL EVERY 6 HOURS PRN
Status: DISCONTINUED | OUTPATIENT
Start: 2024-04-19 | End: 2024-04-21 | Stop reason: HOSPADM

## 2024-04-19 RX ORDER — EPHEDRINE SULFATE 5 MG/ML
10 INJECTION INTRAVENOUS
Status: DISCONTINUED | OUTPATIENT
Start: 2024-04-19 | End: 2024-04-19 | Stop reason: HOSPADM

## 2024-04-19 RX ORDER — HYDROCODONE BITARTRATE AND ACETAMINOPHEN 5; 325 MG/1; MG/1
1 TABLET ORAL EVERY 4 HOURS PRN
Status: DISCONTINUED | OUTPATIENT
Start: 2024-04-19 | End: 2024-04-21 | Stop reason: HOSPADM

## 2024-04-19 RX ORDER — OXYTOCIN/0.9 % SODIUM CHLORIDE 30/500 ML
PLASTIC BAG, INJECTION (ML) INTRAVENOUS
Status: COMPLETED
Start: 2024-04-19 | End: 2024-04-19

## 2024-04-19 RX ORDER — ACETAMINOPHEN 325 MG/1
650 TABLET ORAL EVERY 6 HOURS PRN
Status: DISCONTINUED | OUTPATIENT
Start: 2024-04-19 | End: 2024-04-21 | Stop reason: HOSPADM

## 2024-04-19 RX ORDER — ONDANSETRON 2 MG/ML
4 INJECTION INTRAMUSCULAR; INTRAVENOUS EVERY 6 HOURS PRN
Status: DISCONTINUED | OUTPATIENT
Start: 2024-04-19 | End: 2024-04-21 | Stop reason: HOSPADM

## 2024-04-19 RX ORDER — TERBUTALINE SULFATE 1 MG/ML
0.25 INJECTION, SOLUTION SUBCUTANEOUS AS NEEDED
Status: DISCONTINUED | OUTPATIENT
Start: 2024-04-19 | End: 2024-04-19 | Stop reason: HOSPADM

## 2024-04-19 RX ORDER — DIPHENHYDRAMINE HYDROCHLORIDE 50 MG/ML
12.5 INJECTION INTRAMUSCULAR; INTRAVENOUS EVERY 8 HOURS PRN
Status: DISCONTINUED | OUTPATIENT
Start: 2024-04-19 | End: 2024-04-19 | Stop reason: HOSPADM

## 2024-04-19 RX ORDER — METHYLERGONOVINE MALEATE 0.2 MG/ML
200 INJECTION INTRAVENOUS ONCE AS NEEDED
Status: DISCONTINUED | OUTPATIENT
Start: 2024-04-19 | End: 2024-04-19 | Stop reason: HOSPADM

## 2024-04-19 RX ORDER — MISOPROSTOL 200 UG/1
800 TABLET ORAL AS NEEDED
Status: DISCONTINUED | OUTPATIENT
Start: 2024-04-19 | End: 2024-04-19 | Stop reason: HOSPADM

## 2024-04-19 RX ORDER — ACETAMINOPHEN 325 MG/1
650 TABLET ORAL EVERY 4 HOURS PRN
Status: DISCONTINUED | OUTPATIENT
Start: 2024-04-19 | End: 2024-04-19 | Stop reason: HOSPADM

## 2024-04-19 RX ORDER — ONDANSETRON 2 MG/ML
4 INJECTION INTRAMUSCULAR; INTRAVENOUS ONCE AS NEEDED
Status: COMPLETED | OUTPATIENT
Start: 2024-04-19 | End: 2024-04-19

## 2024-04-19 RX ORDER — CARBOPROST TROMETHAMINE 250 UG/ML
250 INJECTION, SOLUTION INTRAMUSCULAR AS NEEDED
Status: DISCONTINUED | OUTPATIENT
Start: 2024-04-19 | End: 2024-04-21 | Stop reason: HOSPADM

## 2024-04-19 RX ORDER — FAMOTIDINE 10 MG/ML
20 INJECTION, SOLUTION INTRAVENOUS ONCE AS NEEDED
Status: COMPLETED | OUTPATIENT
Start: 2024-04-19 | End: 2024-04-19

## 2024-04-19 RX ADMIN — EPHEDRINE SULFATE 10 MG: 5 INJECTION INTRAVENOUS at 14:24

## 2024-04-19 RX ADMIN — LIDOCAINE HYDROCHLORIDE AND EPINEPHRINE 5 ML: 20; 5 INJECTION, SOLUTION EPIDURAL; INFILTRATION; INTRACAUDAL; PERINEURAL at 14:54

## 2024-04-19 RX ADMIN — FAMOTIDINE 20 MG: 10 INJECTION, SOLUTION INTRAVENOUS at 14:19

## 2024-04-19 RX ADMIN — LIDOCAINE HYDROCHLORIDE AND EPINEPHRINE 3 ML: 15; 5 INJECTION, SOLUTION EPIDURAL at 10:35

## 2024-04-19 RX ADMIN — Medication: at 20:45

## 2024-04-19 RX ADMIN — SODIUM CHLORIDE, POTASSIUM CHLORIDE, SODIUM LACTATE AND CALCIUM CHLORIDE 125 ML/HR: 600; 310; 30; 20 INJECTION, SOLUTION INTRAVENOUS at 11:56

## 2024-04-19 RX ADMIN — Medication 2 MILLI-UNITS/MIN: at 05:22

## 2024-04-19 RX ADMIN — Medication 250 ML/HR: at 17:23

## 2024-04-19 RX ADMIN — SODIUM CHLORIDE, POTASSIUM CHLORIDE, SODIUM LACTATE AND CALCIUM CHLORIDE 1000 ML: 600; 310; 30; 20 INJECTION, SOLUTION INTRAVENOUS at 01:15

## 2024-04-19 RX ADMIN — ROPIVACAINE HYDROCHLORIDE 10 ML: 5 INJECTION, SOLUTION EPIDURAL; INFILTRATION; PERINEURAL at 10:40

## 2024-04-19 RX ADMIN — ROPIVACAINE HYDROCHLORIDE 15 ML/HR: 2 INJECTION, SOLUTION EPIDURAL; INFILTRATION at 10:43

## 2024-04-19 RX ADMIN — ONDANSETRON 4 MG: 2 INJECTION INTRAMUSCULAR; INTRAVENOUS at 16:14

## 2024-04-19 RX ADMIN — FENTANYL CITRATE 100 MCG: 50 INJECTION, SOLUTION INTRAMUSCULAR; INTRAVENOUS at 10:38

## 2024-04-19 RX ADMIN — WITCH HAZEL: 500 SOLUTION RECTAL; TOPICAL at 20:45

## 2024-04-19 RX ADMIN — SODIUM CHLORIDE, POTASSIUM CHLORIDE, SODIUM LACTATE AND CALCIUM CHLORIDE 125 ML/HR: 600; 310; 30; 20 INJECTION, SOLUTION INTRAVENOUS at 02:01

## 2024-04-19 RX ADMIN — SODIUM CHLORIDE, POTASSIUM CHLORIDE, SODIUM LACTATE AND CALCIUM CHLORIDE 4000 ML/HR: 600; 310; 30; 20 INJECTION, SOLUTION INTRAVENOUS at 10:06

## 2024-04-19 RX ADMIN — IBUPROFEN 600 MG: 600 TABLET, FILM COATED ORAL at 20:45

## 2024-04-19 RX ADMIN — MORPHINE SULFATE 2 MG: 2 INJECTION, SOLUTION INTRAMUSCULAR; INTRAVENOUS at 05:10

## 2024-04-19 RX ADMIN — SODIUM CHLORIDE, POTASSIUM CHLORIDE, SODIUM LACTATE AND CALCIUM CHLORIDE 125 ML/HR: 600; 310; 30; 20 INJECTION, SOLUTION INTRAVENOUS at 10:38

## 2024-04-19 RX ADMIN — Medication 1 APPLICATION: at 20:45

## 2024-04-19 NOTE — L&D DELIVERY NOTE
[facility]   Vaginal Delivery Note    Patient Name: Mady Brown  : 1990  MRN: [id number]    Date of Delivery: 2024     Diagnosis     Pre & Post-Delivery:  Intrauterine pregnancy at 38w2d  Labor status:      IUGR (intrauterine growth restriction) affecting care of mother             Problem List    Transfer to Postpartum     Review the Delivery Report for details.     Delivery     Delivery: Vaginal, Spontaneous     YOB: 2022    Time of Birth:  Gestational Age 4:42 PM   38w2d     Anesthesia: Epidural     Delivering clinician:     Forceps?   No   Vacuum? No    Shoulder dystocia present: No        Delivery narrative:  Patient admitted to L&D for mIOL for IUGR. Connected to external FHT and TOCO on presentation to unit. SVE on intake confirmed that she was 1/40/-2.  FHT demonstrated initial Category I findings. Freeman bulb was placed for cervical ripening. Low dose pitocin given. IV pitocin  per protocol initiated following freeman bulb removal. Patient received epidural and was comfortable with contractions. AROM with amnihook performed with return of clear fluid. Maternal and fetal tolerance of procedure.     Labor progressed steadily. Patient felt increasingly more painful with contractions. Patient progressed to complete. I was called to delivery room following report of fetal . Patient delivered via nurse assisted delivery as I presented to room. 300ml blood loss noted on pad. Female infant delivered pink, active, with good tone on maternal abdomen. Delayed cord clamping was performed for 2 minute while infant was stimulated and vigorous cry noted. Umbilical cord was doubly clamped and cut by patient. Infant was attended to by labor and delivery and nursery nurse. Umbilical cord blood collected. Placenta delivered intact spontaneously without complication via gentle umbilical cord traction. IV Pitocin was started. Fundal massage was initiated and fundus was found  "to be firm. Blood and clots were cleared from the vaginal vault. The perineum, vaginal walls, cervix, and paraurethral area were inspected thoroughly. A first degree midline perineal laceration was noted. Laceration reapproximated with 3-0 Vicryl. Epidural was effective for patient comfort. Hemostasis noted. No episiotomy was performed. Placenta was observed following delivery. Cotyledons all intact and membranes complete. Thick 3 vessel cord confirmed. No complications. Mother and baby were stable  when leaving delivery room.     I was present for the portions of delivery as listed above.         Infant     Findings: female  infant     Infant observations: Weight: No birth weight on file.   Length:   in  Observations/Comments:        Apgars:   @ 1 minute /      @ 5 minutes   Infant Name: Darlynbilshavon     Placenta & Cord         Placenta delivered    at   4/19/2024  4:48 PM     Cord: 3 vessels  present.   Nuchal Cord?  no   Cord blood obtained: Yes    Cord gases obtained:  No    Cord gas results: Venous:  No results found for: \"PHCVEN\", \"BECVEN\"    Arterial:  No results found for: \"PHCART\", \"BECART\"     Repair     Episiotomy: Not recorded     No    Lacerations: Yes  Laceration Information  Laceration Repaired?   Perineal:       Periurethral:       Labial:       Sulcus:       Vaginal:       Cervical:         Suture used for repair: 3-0 Vicryl     Estimated Blood Loss:  300ml     Quantitative Blood Loss:          Complications     none    Disposition     Mother to Mother Baby/Postpartum  in stable condition currently.  Baby to NBN  in stable condition currently.    Stephanie Valdivia DO  04/19/24  17:08 EDT        "

## 2024-04-19 NOTE — PAYOR COMM NOTE
"Mady Joyce (33 y.o. Female)       Date of Birth   1990    Social Security Number       Address   [address] [place] [address] [address] [place] [address]    Home Phone   [phone]    MRN   [id number]       Scientologist   None    Marital Status                               Admission Date   24    Admission Type   Elective    Admitting Provider   Stephanie Valdivia DO    Attending Provider   Susan Ferrara MD    Department, Room/Bed   [facility] MOTHER BABY 4B, N437/1       Discharge Date       Discharge Disposition       Discharge Destination                                 Attending Provider: Susan Ferrara MD    Allergies: No Known Allergies    Isolation: None   Infection: None   Code Status: CPR    Ht: 175.3 cm (69\")   Wt: 83.9 kg (185 lb)    Admission Cmt: None   Principal Problem: IUGR (intrauterine growth restriction) affecting care of mother [O36.5990]                   Active Insurance as of 2024       Primary Coverage       Payor Plan Insurance Group Employer/Plan Group    [employer] [place] [employer] [place]ICA[employer]        Payor Plan Address Payor Plan Phone Number Payor Plan Fax Number Effective Dates    [address] [address] [phone]  3/5/2021 - None Entered    [place] [address]         Subscriber Name Subscriber Birth Date Member ID       MADY JOYCE 1990 [id number]                     Emergency Contacts        (Rel.) Home Phone Work Phone Mobile Phone    TORRI JOYCE (Spouse) [phone] -- --                 Operative/Procedure Notes (last 24 hours)        Stephanie Valdivia DO at 24 [facility]           [facility]   Vaginal Delivery Note    Patient Name: Mady Joyce  : 1990  MRN: [id number]    Date of Delivery: 2024     Diagnosis     Pre & Post-Delivery:  Intrauterine pregnancy at 38w2d  Labor status:      IUGR (intrauterine growth restriction) affecting care of mother             Problem List    Transfer to " Postpartum     Review the Delivery Report for details.     Delivery     Delivery: Vaginal, Spontaneous     YOB: 2022    Time of Birth:  Gestational Age 4:42 PM   38w2d     Anesthesia: Epidural     Delivering clinician:     Forceps?   No   Vacuum? No    Shoulder dystocia present: No        Delivery narrative:  Patient admitted to L&D for mIOL for IUGR. Connected to external FHT and TOCO on presentation to unit. SVE on intake confirmed that she was 1/40/-2.  FHT demonstrated initial Category I findings. Freeman bulb was placed for cervical ripening. Low dose pitocin given. IV pitocin  per protocol initiated following freeman bulb removal. Patient received epidural and was comfortable with contractions. AROM with amnihook performed with return of clear fluid. Maternal and fetal tolerance of procedure.     Labor progressed steadily. Patient felt increasingly more painful with contractions. Patient progressed to complete. I was called to delivery room following report of fetal . Patient delivered via nurse assisted delivery as I presented to room. 300ml blood loss noted on pad. Female infant delivered pink, active, with good tone on maternal abdomen. Delayed cord clamping was performed for 2 minute while infant was stimulated and vigorous cry noted. Umbilical cord was doubly clamped and cut by patient. Infant was attended to by labor and delivery and nursery nurse. Umbilical cord blood collected. Placenta delivered intact spontaneously without complication via gentle umbilical cord traction. IV Pitocin was started. Fundal massage was initiated and fundus was found to be firm. Blood and clots were cleared from the vaginal vault. The perineum, vaginal walls, cervix, and paraurethral area were inspected thoroughly. A first degree midline perineal laceration was noted. Laceration reapproximated with 3-0 Vicryl. Epidural was effective for patient comfort. Hemostasis noted. No episiotomy was performed.  "Placenta was observed following delivery. Cotyledons all intact and membranes complete. Thick 3 vessel cord confirmed. No complications. Mother and baby were stable  when leaving delivery room.     I was present for the portions of delivery as listed above.         Infant     Findings: female  infant     Infant observations: Weight: No birth weight on file.   Length:   in  Observations/Comments:        Apgars:   @ 1 minute /      @ 5 minutes   Infant Name: Diane     Placenta & Cord         Placenta delivered    at   4/19/2024  4:48 PM     Cord: 3 vessels  present.   Nuchal Cord?  no   Cord blood obtained: Yes    Cord gases obtained:  No    Cord gas results: Venous:  No results found for: \"PHCVEN\", \"BECVEN\"    Arterial:  No results found for: \"PHCART\", \"BECART\"     Repair     Episiotomy: Not recorded     No    Lacerations: Yes  Laceration Information  Laceration Repaired?   Perineal:       Periurethral:       Labial:       Sulcus:       Vaginal:       Cervical:         Suture used for repair: 3-0 Vicryl     Estimated Blood Loss:  300ml     Quantitative Blood Loss:          Complications     none    Disposition     Mother to Mother Baby/Postpartum  in stable condition currently.  Baby to NBN  in stable condition currently.    Stephanie Valdivia DO  04/19/24  17:08 EDT          Electronically signed by Stephanie Valdivia DO at 04/19/24 1709          Physician Progress Notes (last 24 hours)        Stephanie Valdivia DO at 04/19/24 0918          04/19/24  09:18 EDT  Mady Brown    SUBJECTIVE: comfortable     ASSESSMENTS:     /63 (BP Location: Left arm)   Pulse 71   Temp 97.8 °F (36.6 °C) (Oral)   Resp 18   Ht 175.3 cm (69\")   Wt 83.9 kg (185 lb)   LMP 07/26/2023 (Exact Date)   Breastfeeding Yes   BMI 27.32 kg/m²     Fetal Heart Rate Assessment   Method: Fetal HR Assessment Method: external   Beats/min: Fetal HR (beats/min): 115   Baseline: Fetal HR Baseline: normal range   Varibility: Fetal HR " Variability: moderate (amplitude range 6 to 25 bpm)   Accels: Fetal HR Accelerations: greater than/equal to 15 bpm, lasting at least 15 seconds   Decels: Fetal HR Decelerations: absent   Tracing Category:       Uterine Assessment   Method: Method: external tocotransducer   Frequency (min): Contraction Frequency (Minutes): 4   Ctx Count in 10 min:     Duration:     Intensity: Contraction Intensity: no contractions   Intensity by IUPC:     Resting Tone: Uterine Resting Tone: soft by palpation   Resting Tone by IUPC:       Presentation: cephalic   Cervix: Exam by: Method: sterile exam per physician   Dilation: Cervical Dilation (cm): 4   Effacement: Cervical Effacement: 60%   Station: Fetal Station: -2            IUP at 38w2d   IUGR          PLAN:  -S/p lydia LEVINE /-2  -FHT Cat I  -Discussed AROM including R/B/I/A  -Patient agreeable  -AROM attempted with return of scan amount of fluid. Will place patient in high fowlers  -IV PPP  -maternal position change  -Proceed toward vaginal isaias Valdivia DO  09:18 EDT  24        Electronically signed by Stephanie Valdivia DO at 24 0920     Wt:  3020 g (6lb 10.5 oz)  Apgar: 8 & 9  Vaginal Birth

## 2024-04-19 NOTE — H&P
"[address] [place] [address]  Obstetric History and Physical    Induction.      Subjective     Patient is a 33 y.o. female  currently at 38w2d, who presents for induction of labor  for IUGR  with unfavorable cervix. She voices good fetal movement. She denies vaginal bleeding and leaking of fluid. She plans epidural for labor and birth.     Her prenatal care is complicated by  abnormal fetal growth  IUGR.  Her previous obstetric/gynecological history is non-contributory. She has had  x2.    The following portions of the patients history were reviewed and updated as appropriate: current medications, allergies, past medical history, past surgical history, past family history, past social history, and problem list .       Prenatal Information:   Maternal Prenatal Labs  Blood Type ABO Type   Date Value Ref Range Status   2024 A  Final      Rh Status RH type   Date Value Ref Range Status   2024 Positive  Final      Antibody Screen Antibody Screen   Date Value Ref Range Status   2024 Negative  Final      Gonnorhea No results found for: \"GCCX\"   Chlamydia No results found for: \"CLAMYDCU\"   RPR No results found for: \"RPR\"   Syphilis Antibody No results found for: \"SYPHILIS\"   Rubella No results found for: \"RUBELLAIGGIN\"   Hepatitis B Surface Antigen No results found for: \"HEPBSAG\"   HIV-1 Antibody No results found for: \"LABHIV1\"   Hepatitis C Antibody No results found for: \"HEPCAB\"   Rapid Urin Drug Screen No results found for: \"AMPMETHU\", \"BARBITSCNUR\", \"LABBENZSCN\", \"LABMETHSCN\", \"LABOPIASCN\", \"THCURSCR\", \"COCAINEUR\", \"AMPHETSCREEN\", \"PROPOXSCN\", \"BUPRENORSCNU\", \"METAMPSCNUR\", \"OXYCODONESCN\", \"TRICYCLICSCN\"   Group B Strep Culture Negative                 Past OB History:       OB History    Para Term  AB Living   3 2 2 0 0 2   SAB IAB Ectopic Molar Multiple Live Births   0 0 0 0 0 2      # Outcome Date GA Lbr Roberto/2nd Weight Sex Type Anes PTL Lv   3 Current            2 Term 21 39w5d 16:16 " / 00:13 4060 g (8 lb 15.2 oz) M Vag-Spont EPI N LAI      Name: ODV JOYCE      Apgar1: 8  Apgar5: 9   1 Term 05/22/20 40w1d  4082 g (9 lb) M Vag-Spont EPI  LAI       Past Medical History: Past Medical History:   Diagnosis Date    Cystic fibrosis carrier     Ovarian cyst     Seizures     in childhood, outgrew them per patient       Past Surgical History Past Surgical History:   Procedure Laterality Date    WISDOM TOOTH EXTRACTION        Family History: Family History   Problem Relation Age of Onset    Stroke Maternal Grandfather     Cystic fibrosis Son         [id number] mutation      Social History:  reports that she has never smoked. She has never been exposed to tobacco smoke. She has never used smokeless tobacco.   reports no history of alcohol use.   reports no history of drug use.        REVIEW OF SYSTEMS             Reports fetal movement is normal             Denies leakage of amniotic fluid.             Denies vaginal bleeding             She reports Rare contractions             All other systems reviewed and are negative    Objective       Vital Signs Range for the last 24 hours  Temperature: Temp:  [98.7 °F (37.1 °C)] 98.7 °F (37.1 °C)   Temp Source: Temp src: Oral   BP: BP: (125)/(66) 125/66   Pulse: Heart Rate:  [90] 90   Respirations: Resp:  [16] 16   SPO2:     O2 Amount (l/min):     O2 Devices     Weight: Weight:  [83.9 kg (185 lb)] 83.9 kg (185 lb)       Presentation: vertex   Cervix: Exam by:  DEN Manzo CNM   Dilation:  1   Effacement: Cervical Effacement: 40%   Station:  -2       Fetal Heart Rate Assessment   Method: Fetal HR Assessment Method: external   Beats/min: Fetal HR (beats/min): 125   Baseline: Fetal HR Baseline: normal range   Varibility: Fetal HR Variability: moderate (amplitude range 6 to 25 bpm)   Accels: Fetal HR Accelerations: greater than/equal to 15 bpm   Decels: Fetal HR Decelerations: absent   Tracing Category:  1    NST: REACTIVE     Uterine Assessment   Method: Method:  external tocotransducer   Frequency (min): Contraction Frequency (Minutes): x1   Ctx Count in 10 min:     Duration:     Intensity:     Intensity by IUPC:     Resting Tone:     Resting Tone by IUPC:     Josselin Units:             Constitutional:  Well developed, well nourished, no acute distress, well-groomed.   Respiratory:  Resp e/e/u, normal breath sounds.   Cardiovascular:  Normal rate and rhythm, no murmurs.   Gastrointestinal:  Soft, gravid, nontender.  Uterus: Soft, nontender. Fundus appropriate for dates.  Neurologic:  Alert & oriented x 3,  no focal deficits noted.   Psychiatric:  Speech and behavior appropriate.   Extremities: no cyanosis, clubbing or edema, no evidence of DVT.        Labs:  Lab Results   Component Value Date    WBC 7.03 04/19/2024    HGB 13.4 04/19/2024    HCT 40.0 04/19/2024    MCV 93.5 04/19/2024     04/19/2024    CREATININE 0.49 (L) 04/19/2024    URICACID 3.0 04/19/2024    AST 19 04/19/2024    ALT 7 04/19/2024     (H) 04/19/2024     Results from last 7 days   Lab Units 04/19/24  0117   ABO TYPING  A   RH TYPING  Positive   ANTIBODY SCREEN  Negative           Assessment & Plan       IUGR (intrauterine growth restriction) affecting care of mother        Assessment:  1.  Intrauterine pregnancy at 38w2d weeks gestation with reactive fetal status.    2.   induction of labor  for IUGR  with unfavorable cervix  3.  Obstetrical history is non-contributory.  4.  GBS status: Negative    Plan:  1.Oxytocin induction and Mancia bulb for cervical ripening  2. Plan of care has been reviewed with patient and questions answered.  3.  Risks, benefits of treatment plan have been discussed.  4.  All questions have been answered.        Himanshu Manzo CNM  4/19/2024  02:53 EDT

## 2024-04-19 NOTE — PROGRESS NOTES
"04/19/24  09:18 EDT  Mady Brown    SUBJECTIVE: comfortable     ASSESSMENTS:     /63 (BP Location: Left arm)   Pulse 71   Temp 97.8 °F (36.6 °C) (Oral)   Resp 18   Ht 175.3 cm (69\")   Wt 83.9 kg (185 lb)   LMP 07/26/2023 (Exact Date)   Breastfeeding Yes   BMI 27.32 kg/m²     Fetal Heart Rate Assessment   Method: Fetal HR Assessment Method: external   Beats/min: Fetal HR (beats/min): 115   Baseline: Fetal HR Baseline: normal range   Varibility: Fetal HR Variability: moderate (amplitude range 6 to 25 bpm)   Accels: Fetal HR Accelerations: greater than/equal to 15 bpm, lasting at least 15 seconds   Decels: Fetal HR Decelerations: absent   Tracing Category:       Uterine Assessment   Method: Method: external tocotransducer   Frequency (min): Contraction Frequency (Minutes): 4   Ctx Count in 10 min:     Duration:     Intensity: Contraction Intensity: no contractions   Intensity by IUPC:     Resting Tone: Uterine Resting Tone: soft by palpation   Resting Tone by IUPC:       Presentation: cephalic   Cervix: Exam by: Method: sterile exam per physician   Dilation: Cervical Dilation (cm): 4   Effacement: Cervical Effacement: 60%   Station: Fetal Station: -2            IUP at 38w2d   IUGR          PLAN:  -S/p lydia LEVINE 4/60/-2  -FHT Cat I  -Discussed AROM including R/B/I/A  -Patient agreeable  -AROM attempted with return of scan amount of fluid. Will place patient in high fowlers  -IV PPP  -maternal position change  -Proceed toward vaginal isaias Valdivia DO  09:18 EDT  04/19/24      "

## 2024-04-19 NOTE — ANESTHESIA PROCEDURE NOTES
Labor Epidural      Patient reassessed immediately prior to procedure    Patient location during procedure: OB  Performed By  CRNA/CAA: Debbie Aly CRNA  Preanesthetic Checklist  Completed: patient identified, IV checked, risks and benefits discussed, surgical consent, monitors and equipment checked, pre-op evaluation and timeout performed  Additional Notes  DPE-25 gauge David  Prep:  Pt Position:sitting  Sterile Tech:cap, gloves, mask and sterile barrier  Prep:DuraPrep  Monitoring:blood pressure monitoring  Epidural Block Procedure:  Approach:midline  Guidance:palpation technique  Location:L3-L4  Needle Type:Tuohy  Needle Gauge:17 G  Loss of Resistance Medium: saline  Loss of Resistance: 7cm  Cath Depth at skin:13 cm  Paresthesia: none  Aspiration:negative  Test Dose:negative  Post Assessment:  Dressing:occlusive dressing applied and secured with tape  Pt Tolerance:patient tolerated the procedure well with no apparent complications  Complications:no

## 2024-04-19 NOTE — ANESTHESIA PREPROCEDURE EVALUATION
Anesthesia Evaluation     Patient summary reviewed and Nursing notes reviewed   NPO Solid Status: > 6 hours  NPO Liquid Status: > 6 hours           Airway   Dental      Pulmonary - negative pulmonary ROS   Cardiovascular - negative cardio ROS        Neuro/Psych- negative ROS  GI/Hepatic/Renal/Endo - negative ROS     Musculoskeletal (-) negative ROS    Abdominal    Substance History - negative use     OB/GYN    (+) Pregnant        Other          Other Comment: Cystic Fibrosis carrier              Anesthesia Plan    ASA 2     epidural       Anesthetic plan, risks, benefits, and alternatives have been provided, discussed and informed consent has been obtained with: patient.    CODE STATUS:    Level Of Support Discussed With: Patient  Code Status (Patient has no pulse and is not breathing): CPR (Attempt to Resuscitate)  Medical Interventions (Patient has pulse or is breathing): Full Support

## 2024-04-20 LAB
BASOPHILS # BLD AUTO: 0.03 10*3/MM3 (ref 0–0.2)
BASOPHILS NFR BLD AUTO: 0.3 % (ref 0–1.5)
DEPRECATED RDW RBC AUTO: 48.2 FL (ref 37–54)
EOSINOPHIL # BLD AUTO: 0.22 10*3/MM3 (ref 0–0.4)
EOSINOPHIL NFR BLD AUTO: 2.4 % (ref 0.3–6.2)
ERYTHROCYTE [DISTWIDTH] IN BLOOD BY AUTOMATED COUNT: 13.8 % (ref 12.3–15.4)
HCT VFR BLD AUTO: 33.9 % (ref 34–46.6)
HGB BLD-MCNC: 11.2 G/DL (ref 12–15.9)
IMM GRANULOCYTES # BLD AUTO: 0.03 10*3/MM3 (ref 0–0.05)
IMM GRANULOCYTES NFR BLD AUTO: 0.3 % (ref 0–0.5)
LYMPHOCYTES # BLD AUTO: 0.74 10*3/MM3 (ref 0.7–3.1)
LYMPHOCYTES NFR BLD AUTO: 7.9 % (ref 19.6–45.3)
MCH RBC QN AUTO: 31.5 PG (ref 26.6–33)
MCHC RBC AUTO-ENTMCNC: 33 G/DL (ref 31.5–35.7)
MCV RBC AUTO: 95.5 FL (ref 79–97)
MONOCYTES # BLD AUTO: 0.59 10*3/MM3 (ref 0.1–0.9)
MONOCYTES NFR BLD AUTO: 6.3 % (ref 5–12)
NEUTROPHILS NFR BLD AUTO: 7.75 10*3/MM3 (ref 1.7–7)
NEUTROPHILS NFR BLD AUTO: 82.8 % (ref 42.7–76)
NRBC BLD AUTO-RTO: 0 /100 WBC (ref 0–0.2)
PLATELET # BLD AUTO: 133 10*3/MM3 (ref 140–450)
PMV BLD AUTO: 11.6 FL (ref 6–12)
RBC # BLD AUTO: 3.55 10*6/MM3 (ref 3.77–5.28)
WBC NRBC COR # BLD AUTO: 9.36 10*3/MM3 (ref 3.4–10.8)

## 2024-04-20 PROCEDURE — 85025 COMPLETE CBC W/AUTO DIFF WBC: CPT | Performed by: STUDENT IN AN ORGANIZED HEALTH CARE EDUCATION/TRAINING PROGRAM

## 2024-04-20 RX ORDER — IBUPROFEN 600 MG/1
600 TABLET ORAL EVERY 6 HOURS PRN
Qty: 60 TABLET | Refills: 1 | Status: SHIPPED | OUTPATIENT
Start: 2024-04-20

## 2024-04-20 RX ORDER — PSEUDOEPHEDRINE HCL 30 MG
100 TABLET ORAL 2 TIMES DAILY
Qty: 30 CAPSULE | Refills: 0 | Status: SHIPPED | OUTPATIENT
Start: 2024-04-21

## 2024-04-20 RX ADMIN — DOCUSATE SODIUM 100 MG: 100 CAPSULE, LIQUID FILLED ORAL at 08:53

## 2024-04-20 RX ADMIN — ACETAMINOPHEN 650 MG: 325 TABLET ORAL at 18:35

## 2024-04-20 RX ADMIN — ACETAMINOPHEN 650 MG: 325 TABLET ORAL at 00:19

## 2024-04-20 RX ADMIN — IBUPROFEN 600 MG: 600 TABLET, FILM COATED ORAL at 04:57

## 2024-04-20 RX ADMIN — IBUPROFEN 600 MG: 600 TABLET, FILM COATED ORAL at 14:32

## 2024-04-20 RX ADMIN — IBUPROFEN 600 MG: 600 TABLET, FILM COATED ORAL at 23:34

## 2024-04-20 RX ADMIN — DOCUSATE SODIUM 100 MG: 100 CAPSULE, LIQUID FILLED ORAL at 20:57

## 2024-04-20 RX ADMIN — PRENATAL VITAMINS-IRON FUMARATE 27 MG IRON-FOLIC ACID 0.8 MG TABLET 1 TABLET: at 08:53

## 2024-04-20 RX ADMIN — ACETAMINOPHEN 650 MG: 325 TABLET ORAL at 08:52

## 2024-04-20 NOTE — ANESTHESIA POSTPROCEDURE EVALUATION
Patient: Mady Brown    Procedure Summary       Date: 04/19/24 Room / Location:     Anesthesia Start: 1022 Anesthesia Stop: 1648    Procedure: LABOR ANALGESIA Diagnosis:     Scheduled Providers:  Provider: Brandon Pascual MD    Anesthesia Type: epidural ASA Status: 2            Anesthesia Type: epidural    Vitals  Vitals Value Taken Time   BP 97/52 04/20/24 0709   Temp 97.7 °F (36.5 °C) 04/20/24 0709   Pulse 77 04/20/24 0709   Resp 16 04/20/24 0709   SpO2 99 % 04/19/24 1109           Post Anesthesia Care and Evaluation    Patient location during evaluation: bedside  Patient participation: complete - patient participated  Level of consciousness: awake and awake and alert  Pain score: 0  Pain management: satisfactory to patient    Airway patency: patent  Anesthetic complications: No anesthetic complications  PONV Status: none  Cardiovascular status: acceptable, hemodynamically stable and stable  Respiratory status: acceptable  Hydration status: stable  Post Neuraxial Block status: Motor and sensory function returned to baseline and No signs or symptoms of PDPH

## 2024-04-20 NOTE — LACTATION NOTE
This note was copied from a baby's chart.     24 2100   Maternal Information   Date of Referral 24   Person Making Referral lactation consultant  (courtesy visit, newly postpartum)   Maternal Reason for Referral breastfeeding unsuccessful in past;breastfeeding currently   Infant Reason for Referral  infant   Maternal Infant Feeding   Maternal Emotional State relaxed;receptive   Milk Expression/Equipment   Breast Pump Type double electric, personal  (has Spectra from her sister will let us know if wants a new one)     Reviewed breastfeeding tips handouts with patient. She verbalized understanding. She tried breastfeeding #1 x 2 wks, no latch and was during Covid and couldn't find LC to help, #2 x 1 mo. Reports baby nursed x 5 and 6 min. Just now. We tried to latch in right cross cradle, baby sleepy. She is tender and red. She has a small nipple shield at bedside. Encouraged to call lactation for latch check tomorrow. Encouraged to call outpatient clinic prn after discharge.

## 2024-04-20 NOTE — LACTATION NOTE
"   24 1538   Maternal Information   Date of Referral 24   Person Making Referral nurse  (latch assistance; infant fussy)   Maternal Reason for Referral latch difficulty   Infant Reason for Referral  infant   Maternal Infant Feeding   Maternal Emotional State receptive;relaxed   Infant Positioning clutch/football;cross-cradle  (left; right)   Signs of Milk Transfer audible swallow;deep jaw excursions noted   Pain with Feeding   (only with shallow latching; adjusted positioning and had mom latch infant with chin first at breast for deeper latch; achieved and infant nursed for 13 minutes with using nipple shield)   Comfort Measures Before/During Feeding infant position adjusted;latch adjusted;maternal position adjusted   Latch Assistance minimal assistance;verbal guidance offered   Support Person Involvement   (not in room at time of LC visit)   Milk Expression/Equipment   Breast Pump Type   (encouraged mother to pump after feedings if infant has short/missed feedings)     Follow-up visit; infant fussy; placed infant skin to skin with mother; then attempting to burp infant; assisted with L football hold without using shield; had mother latch with chin first at breast and allowing infant to have the \"yawn\" response; infant nursed for 13 minutes with a great latch; then switched infant to right cross cradle hold and infant nursed for 8 minutes without shield; infant also passing gas and began being fussy; attempted to burp infant, then swaddled infant and passed infant to mother; infant falling to sleep; encouraged mother to use shield if needs it; encouraged to pump for short/missed feedings; to call lactation PRN.  "

## 2024-04-20 NOTE — LACTATION NOTE
24 1011   Maternal Information   Date of Referral 24   Person Making Referral lactation consultant  (courtesy visit)   Maternal Reason for Referral   (3rd time mother; nursed first child for 2 weeks, 2nd for 1 month)   Infant Reason for Referral  infant   Maternal Assessment   Breast Size Issue none   Breast Shape Bilateral:;round   Breast Density Bilateral:;soft   Nipples Bilateral:;short;other (see comments)  (small; has small nipple shield)   Left Nipple Symptoms tender;redness   Right Nipple Symptoms tender;redness   Maternal Infant Feeding   Maternal Emotional State relaxed;receptive   Infant Positioning clutch/football  (right)   Signs of Milk Transfer deep jaw excursions noted;audible swallow   Pain with Feeding other (see comments)  (mother stated it felt better with deeper latching; attempted without shield, but infant could not grasp enough breast tissue, so utilizied small nipple shield and improvement noted)   Comfort Measures Before/During Feeding infant position adjusted;latch adjusted;maternal position adjusted   Latch Assistance minimal assistance   Support Person Involvement actively supporting mother   Milk Expression/Equipment   Breast Pump Type double electric, personal  (has personal Spectra)   Breast Pumping   Breast Pumping Interventions   (may pump for short/missed feedings, if supplementation required, or too painful to latch infant)     Mother has tenderness bilateral nipples; provided soft shells and cooling gel pads for comfort; attempted to latch infant without small nipple shield, but too painful and infant could not grasp enough breast tissue for deeper latching; placed nipple shield and infant latched deeper and mother stated it felt better; infant nursed for 10 minutes on right football hold; encouraged to use shield for comfort; to call lactation PRN.

## 2024-04-20 NOTE — PROGRESS NOTES
[facility]  Obstetric Progress Note    Chief Complaint: PPD #1    Subjective     Mady doing well. Pain is well controlled. Reports light lochia without passage of major clots. Ambulating. Tolerating PO intake. Voiding without difficulty or dysuria. Reports flatus.   Breast feeding. Female infant doing well. Reports good mood.   No further complaints at this time.      Objective     Vital Signs Range for the last 24 hours  Temp:  [97.7 °F (36.5 °C)-98.9 °F (37.2 °C)] 98.2 °F (36.8 °C)   BP: ()/(45-71) 119/56   Heart Rate:  [] 91   Resp:  [16-18] 16   SpO2:  [99 %-100 %] 99 %               Intake/Output last 24 hours:      Intake/Output Summary (Last 24 hours) at 4/20/2024 0316  Last data filed at 4/19/2024 1855  Gross per 24 hour   Intake --   Output 1360 ml   Net -1360 ml       Intake/Output this shift:    No intake/output data recorded.    Physical Exam:  General: A&Ox3. No acute distress.    HEENT Normocephalic, atraumatic    Heart RRR   Lungs CTAB, unlabored breathing   Abdomen Soft, non tender, negative for guarding and rebound   Extremities Exam of extremities: peripheral pulses normal, no pedal edema, no clubbing or cyanosis   Fundus Firm, midline, below umbilicus       Laboratory Results:  Lab Results   Component Value Date    WBC 7.03 04/19/2024    HGB 13.4 04/19/2024    HCT 40.0 04/19/2024    MCV 93.5 04/19/2024     04/19/2024    URICACID 3.0 04/19/2024    AST 19 04/19/2024    ALT 7 04/19/2024    HEPBSAG Negative 09/20/2023         Assessment  PPD# 1 s/p vaginal delivery    Plan  Routine postpartum care.  VSS.  Serial lochia, fundal height checks appropriate. Continue serially.   PP Hgb ordered.  Pain well controled with tylenol and ibuprofen PRN.  Encourage PO hydration, ambulation, IS.  Female infant doing well.   Breastfeeding. Lactation available for consult.       Dispo  Plan for discharge home tomorrow pending patient and infant status.        This note has been electronically  signed.    Stephanie Valdivia DO  03:16 EDT  April 20, 2024

## 2024-04-21 VITALS
WEIGHT: 185 LBS | HEART RATE: 66 BPM | DIASTOLIC BLOOD PRESSURE: 59 MMHG | HEIGHT: 69 IN | OXYGEN SATURATION: 99 % | BODY MASS INDEX: 27.4 KG/M2 | RESPIRATION RATE: 16 BRPM | SYSTOLIC BLOOD PRESSURE: 105 MMHG | TEMPERATURE: 98.1 F

## 2024-04-21 RX ADMIN — IBUPROFEN 600 MG: 600 TABLET, FILM COATED ORAL at 06:22

## 2024-04-21 RX ADMIN — PRENATAL VITAMINS-IRON FUMARATE 27 MG IRON-FOLIC ACID 0.8 MG TABLET 1 TABLET: at 09:13

## 2024-04-21 RX ADMIN — DOCUSATE SODIUM 100 MG: 100 CAPSULE, LIQUID FILLED ORAL at 09:13

## 2024-04-21 NOTE — PLAN OF CARE
Goal Outcome Evaluation:              Outcome Evaluation: Pt. has a great attitude and is adjusting well to her new baby. She is a dedicated and good nurser.

## 2024-04-21 NOTE — PLAN OF CARE
Goal Outcome Evaluation:  Vital signs WDL, fundus firm, lochia WDL, voiding without difficulty, breastfeeding and pumping, taking docusate and pain controlled with motrin and tylenol.

## 2024-04-21 NOTE — PAYOR COMM NOTE
"Mady Joyce (33 y.o. Female)       Date of Birth   1990    Social Security Number       Address   [address] [place] [address] [address] [place] [address]    Home Phone   [phone]    MRN   [id number]       Pentec[employer]alism   None    Marital Status                               Admission Date   4/19/24    Admission Type   Elective    Admitting Provider   Stephanie Valdivia DO    Attending Provider       Department, Room/Bed   [facility] MOTHER BABY 4B, N437/1       Discharge Date   4/21/2024    Discharge Disposition   Home or Self Care    Discharge Destination                                 Attending Provider: (none)   Allergies: No Known Allergies    Isolation: None   Infection: None   Code Status: Prior    Ht: 175.3 cm (69\")   Wt: 83.9 kg (185 lb)    Admission Cmt: None   Principal Problem: IUGR (intrauterine growth restriction) affecting care of mother [O36.5990]                   Active Insurance as of 4/19/2024       Primary Coverage       Payor Plan Insurance Group Employer/Plan Group    [employer] [place] [employer] [place]ICAID        Payor Plan Address Payor Plan Phone Number Payor Plan Fax Number Effective Dates    [address] [address] [phone]  3/5/2021 - None Entered    [facility] [address]         Subscriber Name Subscriber Birth Date Member ID       MADY JOYCE 1990 [id number]                     Emergency Contacts        (Rel.) Home Phone Work Phone Mobile Phone    TORRI JOYCE (Spouse) [phone] -- --              Insurance Information                  [facility]/[employer] MEDICAID Phone: [phone]    Subscriber: Kevin Mady GriggsNeely Subscriber#: [id number]    Group#: -- Precert#: --             Physician Progress Notes (last 4 days)        Stephanie Valdivia DO at 04/20/24 0315          [facility]  Obstetric Progress Note    Chief Complaint: PPD #1    Subjective     Mady doing well. Pain is well controlled. Reports light lochia without " passage of major clots. Ambulating. Tolerating PO intake. Voiding without difficulty or dysuria. Reports flatus.   Breast feeding. Female infant doing well. Reports good mood.   No further complaints at this time.      Objective     Vital Signs Range for the last 24 hours  Temp:  [97.7 °F (36.5 °C)-98.9 °F (37.2 °C)] 98.2 °F (36.8 °C)   BP: ()/(45-71) 119/56   Heart Rate:  [] 91   Resp:  [16-18] 16   SpO2:  [99 %-100 %] 99 %               Intake/Output last 24 hours:      Intake/Output Summary (Last 24 hours) at 4/20/2024 0316  Last data filed at 4/19/2024 1855  Gross per 24 hour   Intake --   Output 1360 ml   Net -1360 ml       Intake/Output this shift:    No intake/output data recorded.    Physical Exam:  General: A&Ox3. No acute distress.    HEENT Normocephalic, atraumatic    Heart RRR   Lungs CTAB, unlabored breathing   Abdomen Soft, non tender, negative for guarding and rebound   Extremities Exam of extremities: peripheral pulses normal, no pedal edema, no clubbing or cyanosis   Fundus Firm, midline, below umbilicus       Laboratory Results:  Lab Results   Component Value Date    WBC 7.03 04/19/2024    HGB 13.4 04/19/2024    HCT 40.0 04/19/2024    MCV 93.5 04/19/2024     04/19/2024    URICACID 3.0 04/19/2024    AST 19 04/19/2024    ALT 7 04/19/2024    HEPBSAG Negative 09/20/2023         Assessment  PPD# 1 s/p vaginal delivery    Plan  Routine postpartum care.  VSS.  Serial lochia, fundal height checks appropriate. Continue serially.   PP Hgb ordered.  Pain well controled with tylenol and ibuprofen PRN.  Encourage PO hydration, ambulation, IS.  Female infant doing well.   Breastfeeding. Lactation available for consult.       Dispo  Plan for discharge home tomorrow pending patient and infant status.        This note has been electronically signed.    Stephanie Valdivia DO  03:16 EDT  April 20, 2024          Electronically signed by Stephanie Valdivia DO at 04/20/24 1246       Stephanie Valdivia,  "DO at 04/19/24 0918          04/19/24  09:18 EDT  Mady Brown    SUBJECTIVE: comfortable     ASSESSMENTS:     /63 (BP Location: Left arm)   Pulse 71   Temp 97.8 °F (36.6 °C) (Oral)   Resp 18   Ht 175.3 cm (69\")   Wt 83.9 kg (185 lb)   LMP 07/26/2023 (Exact Date)   Breastfeeding Yes   BMI 27.32 kg/m²     Fetal Heart Rate Assessment   Method: Fetal HR Assessment Method: external   Beats/min: Fetal HR (beats/min): 115   Baseline: Fetal HR Baseline: normal range   Varibility: Fetal HR Variability: moderate (amplitude range 6 to 25 bpm)   Accels: Fetal HR Accelerations: greater than/equal to 15 bpm, lasting at least 15 seconds   Decels: Fetal HR Decelerations: absent   Tracing Category:       Uterine Assessment   Method: Method: external tocotransducer   Frequency (min): Contraction Frequency (Minutes): 4   Ctx Count in 10 min:     Duration:     Intensity: Contraction Intensity: no contractions   Intensity by IUPC:     Resting Tone: Uterine Resting Tone: soft by palpation   Resting Tone by IUPC:       Presentation: cephalic   Cervix: Exam by: Method: sterile exam per physician   Dilation: Cervical Dilation (cm): 4   Effacement: Cervical Effacement: 60%   Station: Fetal Station: -2            IUP at 38w2d   IUGR          PLAN:  -S/p lydia LEVINE 4/60/-2  -FHT Cat I  -Discussed AROM including R/B/I/A  -Patient agreeable  -AROM attempted with return of scan amount of fluid. Will place patient in high fowlers  -IV PPP  -maternal position change  -Proceed toward vaginal isaias Valdivia DO  09:18 EDT  04/19/24        Electronically signed by Stephanie Valdivia DO at 04/19/24 0920       "

## 2024-04-21 NOTE — DISCHARGE SUMMARY
[facility]  Discharge Summary      Patient: Mady Brown      MR#:[id number]      Service: Obstetrics    Date of Admission: 2024  Date of Discharge:  2024    Admission  Diagnosis:   Problems Addressed this Visit          Gravid and     Pregnant    Relevant Medications    lactated ringers infusion     Other Visit Diagnoses       IUGR (intrauterine growth restriction) affecting care of mother, third trimester, fetus 1        Relevant Medications    lactated ringers infusion          Diagnoses         Codes Comments    38 weeks gestation of pregnancy     ICD-10-CM: Z3A.38  ICD-9-CM: V22.2     IUGR (intrauterine growth restriction) affecting care of mother, third trimester, fetus 1     ICD-10-CM: O36.5931  ICD-9-CM: 656.53           Discharge Diagnosis:   1. 38 weeks gestation of pregnancy    2. IUGR (intrauterine growth restriction) affecting care of mother, third trimester, fetus 1          Presenting Problem/History of Present Illness  IUGR (intrauterine growth restriction) affecting care of mother [O36.5990]   [facility] Course  Patient is a 33 y.o. female presented for [facility] for IUGR.  Patient underwent a vaginal delivery and remained in the hospital for 2 days. During that time she remained afebrile and hemodynamically stable. On the day of discharge, pain was well controlled, ambulating, tolerating PO intake, voiding without difficulty. Female infant doing wellDesires discharge home on PPD#2.        Procedures Performed         Consults:   Consults       No orders found from 3/21/2024 to 2024.            Pertinent Test Results: labs:      Labs:  Lab Results   Component Value Date    WBC 9.36 2024    HGB 11.2 (L) 2024    HCT 33.9 (L) 2024    MCV 95.5 2024     (L) 2024    CREATININE 0.49 (L) 2024    URICACID 3.0 2024    AST 19 2024    ALT 7 2024     (H) 2024     Results from last 7 days   Lab Units  04/19/24  0117   ABO TYPING  A   RH TYPING  Positive   ANTIBODY SCREEN  Negative       Discharge Disposition:  To Home    Condition on Discharge:  Stable    Vital Signs  Temp:  [97.7 °F (36.5 °C)-99.3 °F (37.4 °C)] 98.1 °F (36.7 °C)  Heart Rate:  [66-95] 66  Resp:  [16-18] 16  BP: (105-123)/(59-78) 105/59    Physical Examination    Constitutional: A&Ox3. No apparent distress. Doing well.  HEENT: Normocephalic, atraumatic.  Neurological: Negative for sensory or motor deficit  Cardiovascular: RRR; negative for murmur, rubs, gallops  Respiratory: Clear to auscultation bilaterally; negative for rales, crackles or wheezes  Abdominal: nontender, soft, negative for guarding, rebound  Extremities: negative for edema and tenderness, negative Lyle's  Skin: normal turgor; negative for rash or lesions  Psychiatric: normal affect, normal thought process, good insight, good judgment  Fundus: Firm, below umbilicus, midline      Discharge Disposition  Home or Self Care home    Discharge Medications     Discharge Medications        New Medications        Instructions Start Date   ibuprofen 600 MG tablet  Commonly known as: ADVIL,MOTRIN   Take 1 tablet by mouth Every 6 (Six) Hours As Needed for Mild Pain      Stool Softener 100 MG capsule  Generic drug: docusate sodium   100 mg, Oral, 2 Times Daily             Continue These Medications        Instructions Start Date   acetaminophen 500 MG tablet  Commonly known as: TYLENOL   500 mg, Oral, Every 6 Hours PRN      aspirin 81 MG chewable tablet   81 mg, Oral, Daily      doxylamine 25 MG tablet  Commonly known as: UNISOM   25 mg, Oral, Nightly PRN      PRENATAL VITAMIN PO   Oral      promethazine 25 MG tablet  Commonly known as: PHENERGAN   25 mg, Oral, Every 6 Hours PRN               Discharge Diet:  regular    Activity at Discharge:  pelvic rest    Postop Instructions:  Follow up in office at [facility] in 6 weeks for postpartum evaluation. Contact office if complications arise before that  time. Discussed symptoms of fevers, chills, abdominal pain, increased bleeding. Reviewed bleeding soaking through a pad every 1-2 hours, passage of blood clots larger than the size of her palm, signs of infection including fevers or chills, abrupt increases in pain. If symptoms occur contact office or if severe present to emergency department. Also discussed postpartum blues, depression, SI/HI. N  Nothing vaginally for the next 6 weeks including sexual intercourse, tampons, douches, soaking in a bathtub or hot tub. Resume home medications.    Follow-up Appointments  No future appointments.    [facility] in 6 weeks    Test Results Pending at Discharge  Pending Labs       Order Current Status    Tissue Pathology Exam In process          None     Stephanie Valdivia DO  04/21/24  11:21 EDT    Time: Discharge 20 min

## 2024-04-23 LAB
CYTO UR: NORMAL
LAB AP CASE REPORT: NORMAL
LAB AP CLINICAL INFORMATION: NORMAL
PATH REPORT.FINAL DX SPEC: NORMAL
PATH REPORT.GROSS SPEC: NORMAL

## 2024-04-25 ENCOUNTER — APPOINTMENT (OUTPATIENT)
Dept: ULTRASOUND IMAGING | Facility: HOSPITAL | Age: 34
DRG: 776 | End: 2024-04-25
Payer: COMMERCIAL

## 2024-04-25 ENCOUNTER — HOSPITAL ENCOUNTER (INPATIENT)
Facility: HOSPITAL | Age: 34
LOS: 3 days | Discharge: HOME | DRG: 776 | End: 2024-04-28
Attending: STUDENT IN AN ORGANIZED HEALTH CARE EDUCATION/TRAINING PROGRAM | Admitting: STUDENT IN AN ORGANIZED HEALTH CARE EDUCATION/TRAINING PROGRAM
Payer: COMMERCIAL

## 2024-04-25 ENCOUNTER — PREP FOR SURGERY (OUTPATIENT)
Dept: OTHER | Facility: HOSPITAL | Age: 34
End: 2024-04-25
Payer: COMMERCIAL

## 2024-04-25 LAB
ABO GROUP BLD: NORMAL
ALBUMIN SERPL-MCNC: 3.2 G/DL (ref 3.5–5.2)
ALBUMIN/GLOB SERPL: 1 G/DL
ALP SERPL-CCNC: 99 U/L (ref 39–117)
ALT SERPL W P-5'-P-CCNC: 13 U/L (ref 1–33)
ANION GAP SERPL CALCULATED.3IONS-SCNC: 10 MMOL/L (ref 5–15)
AST SERPL-CCNC: 18 U/L (ref 1–32)
BACTERIA UR QL AUTO: ABNORMAL /HPF
BASOPHILS # BLD AUTO: 0.02 10*3/MM3 (ref 0–0.2)
BASOPHILS NFR BLD AUTO: 0.2 % (ref 0–1.5)
BILIRUB SERPL-MCNC: 0.3 MG/DL (ref 0–1.2)
BILIRUB UR QL STRIP: NEGATIVE
BLD GP AB SCN SERPL QL: NEGATIVE
BUN SERPL-MCNC: 10 MG/DL (ref 6–20)
BUN/CREAT SERPL: 21.7 (ref 7–25)
CALCIUM SPEC-SCNC: 8.1 MG/DL (ref 8.6–10.5)
CHLORIDE SERPL-SCNC: 101 MMOL/L (ref 98–107)
CLARITY UR: ABNORMAL
CO2 SERPL-SCNC: 22 MMOL/L (ref 22–29)
COLOR UR: YELLOW
CREAT SERPL-MCNC: 0.46 MG/DL (ref 0.57–1)
D-LACTATE SERPL-SCNC: 0.8 MMOL/L (ref 0.5–2)
D-LACTATE SERPL-SCNC: 2.3 MMOL/L (ref 0.5–2)
DEPRECATED RDW RBC AUTO: 46.4 FL (ref 37–54)
EGFRCR SERPLBLD CKD-EPI 2021: 131.4 ML/MIN/1.73
EOSINOPHIL # BLD AUTO: 0.01 10*3/MM3 (ref 0–0.4)
EOSINOPHIL NFR BLD AUTO: 0.1 % (ref 0.3–6.2)
ERYTHROCYTE [DISTWIDTH] IN BLOOD BY AUTOMATED COUNT: 13.6 % (ref 12.3–15.4)
FLUAV RNA RESP QL NAA+PROBE: NOT DETECTED
FLUBV RNA RESP QL NAA+PROBE: NOT DETECTED
GLOBULIN UR ELPH-MCNC: 3.1 GM/DL
GLUCOSE SERPL-MCNC: 115 MG/DL (ref 65–99)
GLUCOSE UR STRIP-MCNC: ABNORMAL MG/DL
HCT VFR BLD AUTO: 39 % (ref 34–46.6)
HGB BLD-MCNC: 12.7 G/DL (ref 12–15.9)
HGB UR QL STRIP.AUTO: ABNORMAL
HYALINE CASTS UR QL AUTO: ABNORMAL /LPF
IMM GRANULOCYTES # BLD AUTO: 0.04 10*3/MM3 (ref 0–0.05)
IMM GRANULOCYTES NFR BLD AUTO: 0.5 % (ref 0–0.5)
KETONES UR QL STRIP: NEGATIVE
LEUKOCYTE ESTERASE UR QL STRIP.AUTO: ABNORMAL
LYMPHOCYTES # BLD AUTO: 0.39 10*3/MM3 (ref 0.7–3.1)
LYMPHOCYTES NFR BLD AUTO: 4.6 % (ref 19.6–45.3)
MCH RBC QN AUTO: 30.6 PG (ref 26.6–33)
MCHC RBC AUTO-ENTMCNC: 32.6 G/DL (ref 31.5–35.7)
MCV RBC AUTO: 94 FL (ref 79–97)
MONOCYTES # BLD AUTO: 0.64 10*3/MM3 (ref 0.1–0.9)
MONOCYTES NFR BLD AUTO: 7.5 % (ref 5–12)
NEUTROPHILS NFR BLD AUTO: 7.44 10*3/MM3 (ref 1.7–7)
NEUTROPHILS NFR BLD AUTO: 87.1 % (ref 42.7–76)
NITRITE UR QL STRIP: POSITIVE
NRBC BLD AUTO-RTO: 0 /100 WBC (ref 0–0.2)
PH UR STRIP.AUTO: 5.5 [PH] (ref 5–8)
PLATELET # BLD AUTO: 145 10*3/MM3 (ref 140–450)
PMV BLD AUTO: 10.4 FL (ref 6–12)
POTASSIUM SERPL-SCNC: 3.7 MMOL/L (ref 3.5–5.2)
PROT SERPL-MCNC: 6.3 G/DL (ref 6–8.5)
PROT UR QL STRIP: ABNORMAL
RBC # BLD AUTO: 4.15 10*6/MM3 (ref 3.77–5.28)
RBC # UR STRIP: ABNORMAL /HPF
REF LAB TEST METHOD: ABNORMAL
RH BLD: POSITIVE
SARS-COV-2 RNA RESP QL NAA+PROBE: NOT DETECTED
SODIUM SERPL-SCNC: 133 MMOL/L (ref 136–145)
SP GR UR STRIP: 1.02 (ref 1–1.03)
SQUAMOUS #/AREA URNS HPF: ABNORMAL /HPF
T&S EXPIRATION DATE: NORMAL
UROBILINOGEN UR QL STRIP: ABNORMAL
WBC # UR STRIP: ABNORMAL /HPF
WBC NRBC COR # BLD AUTO: 8.54 10*3/MM3 (ref 3.4–10.8)

## 2024-04-25 PROCEDURE — 25810000003 SODIUM CHLORIDE 0.9 % SOLUTION: Performed by: OBSTETRICS & GYNECOLOGY

## 2024-04-25 PROCEDURE — 86850 RBC ANTIBODY SCREEN: CPT | Performed by: STUDENT IN AN ORGANIZED HEALTH CARE EDUCATION/TRAINING PROGRAM

## 2024-04-25 PROCEDURE — 87086 URINE CULTURE/COLONY COUNT: CPT | Performed by: STUDENT IN AN ORGANIZED HEALTH CARE EDUCATION/TRAINING PROGRAM

## 2024-04-25 PROCEDURE — 25010000002 GENTAMICIN PER 80 MG: Performed by: STUDENT IN AN ORGANIZED HEALTH CARE EDUCATION/TRAINING PROGRAM

## 2024-04-25 PROCEDURE — 87077 CULTURE AEROBIC IDENTIFY: CPT | Performed by: STUDENT IN AN ORGANIZED HEALTH CARE EDUCATION/TRAINING PROGRAM

## 2024-04-25 PROCEDURE — 25010000002 CLINDAMYCIN 900 MG/50ML SOLUTION: Performed by: STUDENT IN AN ORGANIZED HEALTH CARE EDUCATION/TRAINING PROGRAM

## 2024-04-25 PROCEDURE — 83605 ASSAY OF LACTIC ACID: CPT | Performed by: STUDENT IN AN ORGANIZED HEALTH CARE EDUCATION/TRAINING PROGRAM

## 2024-04-25 PROCEDURE — 86901 BLOOD TYPING SEROLOGIC RH(D): CPT | Performed by: STUDENT IN AN ORGANIZED HEALTH CARE EDUCATION/TRAINING PROGRAM

## 2024-04-25 PROCEDURE — 76830 TRANSVAGINAL US NON-OB: CPT

## 2024-04-25 PROCEDURE — 83605 ASSAY OF LACTIC ACID: CPT | Performed by: OBSTETRICS & GYNECOLOGY

## 2024-04-25 PROCEDURE — 81001 URINALYSIS AUTO W/SCOPE: CPT | Performed by: STUDENT IN AN ORGANIZED HEALTH CARE EDUCATION/TRAINING PROGRAM

## 2024-04-25 PROCEDURE — 87186 SC STD MICRODIL/AGAR DIL: CPT | Performed by: STUDENT IN AN ORGANIZED HEALTH CARE EDUCATION/TRAINING PROGRAM

## 2024-04-25 PROCEDURE — 80053 COMPREHEN METABOLIC PANEL: CPT | Performed by: STUDENT IN AN ORGANIZED HEALTH CARE EDUCATION/TRAINING PROGRAM

## 2024-04-25 PROCEDURE — 86900 BLOOD TYPING SEROLOGIC ABO: CPT | Performed by: STUDENT IN AN ORGANIZED HEALTH CARE EDUCATION/TRAINING PROGRAM

## 2024-04-25 PROCEDURE — 87040 BLOOD CULTURE FOR BACTERIA: CPT | Performed by: STUDENT IN AN ORGANIZED HEALTH CARE EDUCATION/TRAINING PROGRAM

## 2024-04-25 PROCEDURE — 25010000002 AMPICILLIN PER 500 MG: Performed by: OBSTETRICS & GYNECOLOGY

## 2024-04-25 PROCEDURE — 25810000003 LACTATED RINGERS PER 1000 ML: Performed by: STUDENT IN AN ORGANIZED HEALTH CARE EDUCATION/TRAINING PROGRAM

## 2024-04-25 PROCEDURE — 85025 COMPLETE CBC W/AUTO DIFF WBC: CPT | Performed by: STUDENT IN AN ORGANIZED HEALTH CARE EDUCATION/TRAINING PROGRAM

## 2024-04-25 PROCEDURE — 87636 SARSCOV2 & INF A&B AMP PRB: CPT | Performed by: STUDENT IN AN ORGANIZED HEALTH CARE EDUCATION/TRAINING PROGRAM

## 2024-04-25 RX ORDER — FAMOTIDINE 20 MG/1
20 TABLET, FILM COATED ORAL DAILY
Status: DISCONTINUED | OUTPATIENT
Start: 2024-04-25 | End: 2024-04-28 | Stop reason: HOSPADM

## 2024-04-25 RX ORDER — PROMETHAZINE HYDROCHLORIDE 12.5 MG/1
12.5 SUPPOSITORY RECTAL EVERY 6 HOURS PRN
Status: CANCELLED | OUTPATIENT
Start: 2024-04-25

## 2024-04-25 RX ORDER — IBUPROFEN 600 MG/1
600 TABLET ORAL EVERY 6 HOURS SCHEDULED
Status: DISCONTINUED | OUTPATIENT
Start: 2024-04-26 | End: 2024-04-28 | Stop reason: HOSPADM

## 2024-04-25 RX ORDER — ONDANSETRON 2 MG/ML
4 INJECTION INTRAMUSCULAR; INTRAVENOUS EVERY 8 HOURS PRN
Status: DISCONTINUED | OUTPATIENT
Start: 2024-04-25 | End: 2024-04-28 | Stop reason: HOSPADM

## 2024-04-25 RX ORDER — CITRIC ACID/SODIUM CITRATE 334-500MG
15 SOLUTION, ORAL ORAL ONCE
Status: DISCONTINUED | OUTPATIENT
Start: 2024-04-25 | End: 2024-04-28 | Stop reason: HOSPADM

## 2024-04-25 RX ORDER — ACETAMINOPHEN 325 MG/1
650 TABLET ORAL EVERY 4 HOURS PRN
Status: DISCONTINUED | OUTPATIENT
Start: 2024-04-25 | End: 2024-04-28 | Stop reason: HOSPADM

## 2024-04-25 RX ORDER — PROMETHAZINE HYDROCHLORIDE 12.5 MG/1
12.5 TABLET ORAL EVERY 6 HOURS PRN
Status: DISCONTINUED | OUTPATIENT
Start: 2024-04-25 | End: 2024-04-28 | Stop reason: HOSPADM

## 2024-04-25 RX ORDER — CLINDAMYCIN PHOSPHATE 900 MG/50ML
900 INJECTION, SOLUTION INTRAVENOUS EVERY 8 HOURS
Status: COMPLETED | OUTPATIENT
Start: 2024-04-26 | End: 2024-04-27

## 2024-04-25 RX ORDER — BISACODYL 10 MG
10 SUPPOSITORY, RECTAL RECTAL DAILY PRN
Status: DISCONTINUED | OUTPATIENT
Start: 2024-04-25 | End: 2024-04-28 | Stop reason: HOSPADM

## 2024-04-25 RX ORDER — SODIUM CHLORIDE 9 MG/ML
40 INJECTION, SOLUTION INTRAVENOUS AS NEEDED
Status: DISCONTINUED | OUTPATIENT
Start: 2024-04-25 | End: 2024-04-28 | Stop reason: HOSPADM

## 2024-04-25 RX ORDER — DIPHENHYDRAMINE HYDROCHLORIDE 50 MG/ML
25 INJECTION INTRAMUSCULAR; INTRAVENOUS EVERY 4 HOURS PRN
Status: CANCELLED | OUTPATIENT
Start: 2024-04-25

## 2024-04-25 RX ORDER — PROMETHAZINE HYDROCHLORIDE 12.5 MG/1
12.5 TABLET ORAL EVERY 6 HOURS PRN
Status: CANCELLED | OUTPATIENT
Start: 2024-04-25

## 2024-04-25 RX ORDER — ONDANSETRON 4 MG/1
8 TABLET, ORALLY DISINTEGRATING ORAL EVERY 8 HOURS PRN
Status: CANCELLED | OUTPATIENT
Start: 2024-04-25

## 2024-04-25 RX ORDER — LIDOCAINE HYDROCHLORIDE 10 MG/ML
0.5 INJECTION, SOLUTION EPIDURAL; INFILTRATION; INTRACAUDAL; PERINEURAL ONCE AS NEEDED
Status: DISCONTINUED | OUTPATIENT
Start: 2024-04-25 | End: 2024-04-28 | Stop reason: HOSPADM

## 2024-04-25 RX ORDER — LIDOCAINE HYDROCHLORIDE 10 MG/ML
0.5 INJECTION, SOLUTION EPIDURAL; INFILTRATION; INTRACAUDAL; PERINEURAL ONCE AS NEEDED
Status: CANCELLED | OUTPATIENT
Start: 2024-04-25

## 2024-04-25 RX ORDER — DOCUSATE SODIUM 100 MG/1
100 CAPSULE, LIQUID FILLED ORAL 2 TIMES DAILY PRN
Status: DISCONTINUED | OUTPATIENT
Start: 2024-04-25 | End: 2024-04-28 | Stop reason: HOSPADM

## 2024-04-25 RX ORDER — PROMETHAZINE HYDROCHLORIDE 12.5 MG/1
12.5 SUPPOSITORY RECTAL EVERY 6 HOURS PRN
Status: DISCONTINUED | OUTPATIENT
Start: 2024-04-25 | End: 2024-04-28 | Stop reason: HOSPADM

## 2024-04-25 RX ORDER — SODIUM CHLORIDE 0.9 % (FLUSH) 0.9 %
10 SYRINGE (ML) INJECTION EVERY 12 HOURS SCHEDULED
Status: DISCONTINUED | OUTPATIENT
Start: 2024-04-25 | End: 2024-04-28 | Stop reason: HOSPADM

## 2024-04-25 RX ORDER — ONDANSETRON 4 MG/1
8 TABLET, ORALLY DISINTEGRATING ORAL EVERY 8 HOURS PRN
Status: DISCONTINUED | OUTPATIENT
Start: 2024-04-25 | End: 2024-04-28 | Stop reason: HOSPADM

## 2024-04-25 RX ORDER — FAMOTIDINE 20 MG/1
20 TABLET, FILM COATED ORAL DAILY
Status: CANCELLED | OUTPATIENT
Start: 2024-04-25

## 2024-04-25 RX ORDER — CLINDAMYCIN PHOSPHATE 900 MG/50ML
900 INJECTION, SOLUTION INTRAVENOUS EVERY 8 HOURS
Status: CANCELLED | OUTPATIENT
Start: 2024-04-25 | End: 2024-04-27

## 2024-04-25 RX ORDER — CALCIUM CARBONATE 500 MG/1
2 TABLET, CHEWABLE ORAL DAILY PRN
Status: CANCELLED | OUTPATIENT
Start: 2024-04-25

## 2024-04-25 RX ORDER — DOCUSATE SODIUM 100 MG/1
100 CAPSULE, LIQUID FILLED ORAL 2 TIMES DAILY PRN
Status: CANCELLED | OUTPATIENT
Start: 2024-04-25

## 2024-04-25 RX ORDER — DIPHENHYDRAMINE HCL 25 MG
25 CAPSULE ORAL EVERY 4 HOURS PRN
Status: DISCONTINUED | OUTPATIENT
Start: 2024-04-25 | End: 2024-04-28 | Stop reason: HOSPADM

## 2024-04-25 RX ORDER — SODIUM CHLORIDE 0.9 % (FLUSH) 0.9 %
10 SYRINGE (ML) INJECTION AS NEEDED
Status: DISCONTINUED | OUTPATIENT
Start: 2024-04-25 | End: 2024-04-28 | Stop reason: HOSPADM

## 2024-04-25 RX ORDER — ONDANSETRON 2 MG/ML
4 INJECTION INTRAMUSCULAR; INTRAVENOUS EVERY 8 HOURS PRN
Status: CANCELLED | OUTPATIENT
Start: 2024-04-25

## 2024-04-25 RX ORDER — ACETAMINOPHEN 325 MG/1
650 TABLET ORAL EVERY 4 HOURS PRN
Status: CANCELLED | OUTPATIENT
Start: 2024-04-25

## 2024-04-25 RX ORDER — DIPHENHYDRAMINE HCL 25 MG
25 CAPSULE ORAL EVERY 4 HOURS PRN
Status: CANCELLED | OUTPATIENT
Start: 2024-04-25

## 2024-04-25 RX ORDER — BISACODYL 10 MG
10 SUPPOSITORY, RECTAL RECTAL DAILY PRN
Status: CANCELLED | OUTPATIENT
Start: 2024-04-25

## 2024-04-25 RX ORDER — SODIUM CHLORIDE, SODIUM LACTATE, POTASSIUM CHLORIDE, CALCIUM CHLORIDE 600; 310; 30; 20 MG/100ML; MG/100ML; MG/100ML; MG/100ML
125 INJECTION, SOLUTION INTRAVENOUS CONTINUOUS
Status: CANCELLED | OUTPATIENT
Start: 2024-04-25

## 2024-04-25 RX ORDER — ALUMINA, MAGNESIA, AND SIMETHICONE 2400; 2400; 240 MG/30ML; MG/30ML; MG/30ML
15 SUSPENSION ORAL EVERY 6 HOURS PRN
Status: CANCELLED | OUTPATIENT
Start: 2024-04-25

## 2024-04-25 RX ORDER — CITRIC ACID/SODIUM CITRATE 334-500MG
15 SOLUTION, ORAL ORAL ONCE
Status: CANCELLED | OUTPATIENT
Start: 2024-04-25 | End: 2024-04-25

## 2024-04-25 RX ORDER — CLINDAMYCIN PHOSPHATE 900 MG/50ML
900 INJECTION, SOLUTION INTRAVENOUS EVERY 8 HOURS
Status: DISCONTINUED | OUTPATIENT
Start: 2024-04-25 | End: 2024-04-25

## 2024-04-25 RX ORDER — ALUMINA, MAGNESIA, AND SIMETHICONE 2400; 2400; 240 MG/30ML; MG/30ML; MG/30ML
15 SUSPENSION ORAL EVERY 6 HOURS PRN
Status: DISCONTINUED | OUTPATIENT
Start: 2024-04-25 | End: 2024-04-28 | Stop reason: HOSPADM

## 2024-04-25 RX ORDER — SODIUM CHLORIDE, SODIUM LACTATE, POTASSIUM CHLORIDE, CALCIUM CHLORIDE 600; 310; 30; 20 MG/100ML; MG/100ML; MG/100ML; MG/100ML
125 INJECTION, SOLUTION INTRAVENOUS CONTINUOUS
Status: DISCONTINUED | OUTPATIENT
Start: 2024-04-25 | End: 2024-04-28 | Stop reason: HOSPADM

## 2024-04-25 RX ORDER — SODIUM CHLORIDE 9 MG/ML
40 INJECTION, SOLUTION INTRAVENOUS AS NEEDED
Status: CANCELLED | OUTPATIENT
Start: 2024-04-25

## 2024-04-25 RX ORDER — SODIUM CHLORIDE 0.9 % (FLUSH) 0.9 %
10 SYRINGE (ML) INJECTION EVERY 12 HOURS SCHEDULED
Status: CANCELLED | OUTPATIENT
Start: 2024-04-25

## 2024-04-25 RX ORDER — SODIUM CHLORIDE 0.9 % (FLUSH) 0.9 %
10 SYRINGE (ML) INJECTION AS NEEDED
Status: CANCELLED | OUTPATIENT
Start: 2024-04-25

## 2024-04-25 RX ORDER — DIPHENHYDRAMINE HYDROCHLORIDE 50 MG/ML
25 INJECTION INTRAMUSCULAR; INTRAVENOUS EVERY 4 HOURS PRN
Status: DISCONTINUED | OUTPATIENT
Start: 2024-04-25 | End: 2024-04-28 | Stop reason: HOSPADM

## 2024-04-25 RX ORDER — CALCIUM CARBONATE 500 MG/1
2 TABLET, CHEWABLE ORAL DAILY PRN
Status: DISCONTINUED | OUTPATIENT
Start: 2024-04-25 | End: 2024-04-28 | Stop reason: HOSPADM

## 2024-04-25 RX ADMIN — Medication 10 ML: at 16:14

## 2024-04-25 RX ADMIN — Medication 10 ML: at 20:24

## 2024-04-25 RX ADMIN — AMPICILLIN SODIUM 2 G: 2 INJECTION, POWDER, FOR SOLUTION INTRAMUSCULAR; INTRAVENOUS at 20:11

## 2024-04-25 RX ADMIN — GENTAMICIN SULFATE 370 MG: 40 INJECTION, SOLUTION INTRAMUSCULAR; INTRAVENOUS at 17:10

## 2024-04-25 RX ADMIN — SODIUM CHLORIDE, POTASSIUM CHLORIDE, SODIUM LACTATE AND CALCIUM CHLORIDE 125 ML/HR: 600; 310; 30; 20 INJECTION, SOLUTION INTRAVENOUS at 17:10

## 2024-04-25 RX ADMIN — SODIUM CHLORIDE 500 ML: 9 INJECTION, SOLUTION INTRAVENOUS at 23:53

## 2024-04-25 RX ADMIN — CLINDAMYCIN PHOSPHATE 900 MG: 900 INJECTION, SOLUTION INTRAVENOUS at 17:43

## 2024-04-25 RX ADMIN — IBUPROFEN 600 MG: 600 TABLET, FILM COATED ORAL at 23:53

## 2024-04-25 RX ADMIN — FAMOTIDINE 20 MG: 20 TABLET, FILM COATED ORAL at 16:12

## 2024-04-25 RX ADMIN — ACETAMINOPHEN 650 MG: 325 TABLET ORAL at 20:24

## 2024-04-25 RX ADMIN — ACETAMINOPHEN 650 MG: 325 TABLET ORAL at 16:12

## 2024-04-25 NOTE — PLAN OF CARE
Goal Outcome Evaluation:  Plan of Care Reviewed With: patient, spouse        Progress: improving  Outcome Evaluation: IV antibiotics started. UA remarkable, resp swab negative. Per Dr. Ferrara, surgery not indicated. Pt changed to regular diet. Temp has been decreasing.

## 2024-04-25 NOTE — PROGRESS NOTES
[facility]  GYN Progress Note    Chief Complaint: HD 1    Subjective     Admitted for possible PP endometritis on PPD6 after fever of 102 and body aches/chills at home.   Objective     Vital Signs Range for the last 24 hours  Temp:  [99 °F (37.2 °C)-100.9 °F (38.3 °C)] 99 °F (37.2 °C)   BP: (106-124)/(58-71) 106/58   Heart Rate:  [118-129] 118   Resp:  [18] 18   SpO2:  [97 %-99 %] 97 %       Device (Oxygen Therapy): room air       Intake/Output last 24 hours:    No intake or output data in the 24 hours ending 04/25/24 1724    Intake/Output this shift:    No intake/output data recorded.    Physical Exam:  General: Patient is comfortable   Heart CVS exam: normal rate and regular rhythm.   Lungs Chest: clear to auscultation, no wheezes, rales or rhonchi, symmetric air entry.     Abdomen Abdominal exam: non tender, non distended.   Extremities Exam of extremities: pedal edema  none       Laboratory Results:  WBC   Date Value Ref Range Status   04/25/2024 8.54 3.40 - 10.80 10*3/mm3 Final     RBC   Date Value Ref Range Status   04/25/2024 4.15 3.77 - 5.28 10*6/mm3 Final     Hemoglobin   Date Value Ref Range Status   04/25/2024 12.7 12.0 - 15.9 g/dL Final     Hematocrit   Date Value Ref Range Status   04/25/2024 39.0 34.0 - 46.6 % Final     MCV   Date Value Ref Range Status   04/25/2024 94.0 79.0 - 97.0 fL Final     MCH   Date Value Ref Range Status   04/25/2024 30.6 26.6 - 33.0 pg Final     MCHC   Date Value Ref Range Status   04/25/2024 32.6 31.5 - 35.7 g/dL Final     RDW   Date Value Ref Range Status   04/25/2024 13.6 12.3 - 15.4 % Final     RDW-SD   Date Value Ref Range Status   04/25/2024 46.4 37.0 - 54.0 fl Final     MPV   Date Value Ref Range Status   04/25/2024 10.4 6.0 - 12.0 fL Final     Platelets   Date Value Ref Range Status   04/25/2024 145 140 - 450 10*3/mm3 Final     Neutrophil %   Date Value Ref Range Status   04/25/2024 87.1 (H) 42.7 - 76.0 % Final     Lymphocyte %   Date Value Ref Range Status    04/25/2024 4.6 (L) 19.6 - 45.3 % Final     Monocyte %   Date Value Ref Range Status   04/25/2024 7.5 5.0 - 12.0 % Final     Eosinophil %   Date Value Ref Range Status   04/25/2024 0.1 (L) 0.3 - 6.2 % Final     Basophil %   Date Value Ref Range Status   04/25/2024 0.2 0.0 - 1.5 % Final     Immature Grans %   Date Value Ref Range Status   04/25/2024 0.5 0.0 - 0.5 % Final     Neutrophils, Absolute   Date Value Ref Range Status   04/25/2024 7.44 (H) 1.70 - 7.00 10*3/mm3 Final     Lymphocytes, Absolute   Date Value Ref Range Status   04/25/2024 0.39 (L) 0.70 - 3.10 10*3/mm3 Final     Monocytes, Absolute   Date Value Ref Range Status   04/25/2024 0.64 0.10 - 0.90 10*3/mm3 Final     Eosinophils, Absolute   Date Value Ref Range Status   04/25/2024 0.01 0.00 - 0.40 10*3/mm3 Final     Basophils, Absolute   Date Value Ref Range Status   04/25/2024 0.02 0.00 - 0.20 10*3/mm3 Final     Immature Grans, Absolute   Date Value Ref Range Status   04/25/2024 0.04 0.00 - 0.05 10*3/mm3 Final     nRBC   Date Value Ref Range Status   04/25/2024 0.0 0.0 - 0.2 /100 WBC Final     Lab Results   Component Value Date    GLUCOSE 115 (H) 04/25/2024    BUN 10 04/25/2024    CREATININE 0.46 (L) 04/25/2024    EGFR 131.4 04/25/2024    BCR 21.7 04/25/2024    K 3.7 04/25/2024    CO2 22.0 04/25/2024    CALCIUM 8.1 (L) 04/25/2024    ALBUMIN 3.2 (L) 04/25/2024    BILITOT 0.3 04/25/2024    AST 18 04/25/2024    ALT 13 04/25/2024     Brief Urine Lab Results  (Last result in the past 365 days)        Color   Clarity   Blood   Leuk Est   Nitrite   Protein   CREAT   Urine HCG        04/25/24 1615 Yellow   Cloudy   Large (3+)   Moderate (2+)   Positive   100 mg/dL (2+)                  Lactate 2.3  Blood Cx: pending  Urine Cx: pending    Resp panel neg    TVUS: I personally reviewed imaging. EMS appears clean small amount of fluid collection in cervix/ nicola, is likely clot.       Assessment/Plan:PPD 6 with PP fever   PP Fever:  Acute cystitis: + nitrite on UA, Cx  pending. On Gent/Clinda, adding Ampicillin to cover cystitis  Possible Endometritis: imaging low likelihood for RPOC. Bleeding minimal. Will monitor for clinical improvement. On Amp/Gent/Clinda  Elevated Lactate: will rpt in 4 hours. No leukocytosis or abnormal VS.Blood culture pending. No other s/s sepsis  Antipyretics prn    2. Routine PP care  3. Regular diet  4. Ppx: SCD          Susan Ferrara MD  4/25/2024  17:24 EDT

## 2024-04-25 NOTE — H&P
"[facility]  GYN History and Physical    Postpartum Endometritis    Subjective     Patient is a 33 y.o. female  PPD#6 s/p  without complications. Patient was allowed to recover on postpartum then discharged home on PPD#2. She was seen in office today with complaints of fevers and overall \"not feeling well\". States that she experienced a temperature of 102 at home yesterday that came down to normal after ibuprofen and tylenol. She reports that fever was back up to 102 this morning when she woke up. Reports that she has felt achy and has had chills. Reports that vaginal bleeding has been minimal but did pass through a pad this am. Denies lightheadedness, dizziness, syncope. She denies nausea and vomiting. Denies purulent vaginal discharge but reports vaginal odor. No further complaints at this time.            Past OB History:            OB History    Para Term  AB Living   3 3 3 0 0 3   SAB IAB Ectopic Molar Multiple Live Births   0 0 0 0 0 3      # Outcome Date GA Lbr Roberto/2nd Weight Sex Type Anes PTL Lv   3 Term 24 38w2d / 00:04 3020 g (6 lb 10.5 oz) F Vag-Spont EPI N LAI      Name: Diane Joyce      Apgar1: 8  Apgar5: 9   2 Term 23 39w5d 16:16 / 00:13 4060 g (8 lb 15.2 oz) M Vag-Spont EPI N LAI      Name: DOV JOYCE      Apgar1: 8  Apgar5: 9   1 Term 20 40w1d  4082 g (9 lb) M Vag-Spont EPI  ALI     Medications:     Prenatal Vit-Fe Fumarate-FA, acetaminophen, aspirin, docusate sodium, doxylamine, ibuprofen, and promethazine   Allergies:     No Known Allergies   Past Medical History: Past Medical History:   Diagnosis Date    Cystic fibrosis carrier     Ovarian cyst     Seizures     in childhood, outgrew them per patient       Past Surgical History Past Surgical History:   Procedure Laterality Date    WISDOM TOOTH EXTRACTION        Family History: Family History   Problem Relation Age of Onset    Stroke Maternal Grandfather     Cystic fibrosis Son         [id number] " mutation      Social History:  reports that she has never smoked. She has never been exposed to tobacco smoke. She has never used smokeless tobacco.   reports no history of alcohol use.   reports no history of drug use.              General ROS: Pertinent items are noted in HPI, all other systems reviewed and negative    Objective -performed in office     Vital signs stable      Physical Examination:   General appearance - alert, well appearing, and in no distress, oriented to person, place, and time, and normal appearing weight  Chest - unlabored breathing, no audible wheezes  Heart - no edema  Abdomen - soft, nontender, nondistended, no masses or organomegaly  Pelvic - normal external genitalia, vagina, cervix. Tenderness on insertion of speculum and odor noted on exam. Small amount of dark red blood within vaginal vault. Bimanual exam with noted fundal tenderness  Extremities - peripheral pulses normal, no pedal edema, no clubbing or cyanosi        Laboratory Results:    Pending on admission        Assessment/Plan:    1.  Postpartum Endometritis    Recurrent fevers with Tmax 102 most recent today at 0900. Afebrile in office  Exam findings notable for uterine tenderness and vaginal odor  Diagnosis of postpartum endometritis. Patient admitted for IV antibiotics and management  Clindamycin 900mg every 8 hours  Gentamicin 5mg/kg every 24 hours  Will continue antibiotics until 24-48 afebrile with clinical improvement  TVUS ordered to rule out retained products of conceptions  Labs on intake ordered-CBC, CMP, LA, UA, respiratory panel, blood cultures  Ibuprofen, tyelnol, colace, zofran PRN  Diet: NPO - advance to regular pending result of ultrasound imaging   Activity: ad anabel  DVT ppx: SCD    Dispo: admission to  for IV antibiotics and close monitoring. Discussed care with Dr. Ferrara on call.       Stephanie Valdivia DO  4/25/2024  13:09 EDT

## 2024-04-26 LAB
ANION GAP SERPL CALCULATED.3IONS-SCNC: 10 MMOL/L (ref 5–15)
BUN SERPL-MCNC: 11 MG/DL (ref 6–20)
BUN/CREAT SERPL: 25 (ref 7–25)
CALCIUM SPEC-SCNC: 8 MG/DL (ref 8.6–10.5)
CHLORIDE SERPL-SCNC: 105 MMOL/L (ref 98–107)
CO2 SERPL-SCNC: 25 MMOL/L (ref 22–29)
CREAT SERPL-MCNC: 0.44 MG/DL (ref 0.57–1)
DEPRECATED RDW RBC AUTO: 47.1 FL (ref 37–54)
EGFRCR SERPLBLD CKD-EPI 2021: 132.8 ML/MIN/1.73
ERYTHROCYTE [DISTWIDTH] IN BLOOD BY AUTOMATED COUNT: 13.7 % (ref 12.3–15.4)
GLUCOSE SERPL-MCNC: 102 MG/DL (ref 65–99)
HCT VFR BLD AUTO: 34.2 % (ref 34–46.6)
HGB BLD-MCNC: 11.2 G/DL (ref 12–15.9)
MCH RBC QN AUTO: 30.5 PG (ref 26.6–33)
MCHC RBC AUTO-ENTMCNC: 32.7 G/DL (ref 31.5–35.7)
MCV RBC AUTO: 93.2 FL (ref 79–97)
PLATELET # BLD AUTO: 132 10*3/MM3 (ref 140–450)
PMV BLD AUTO: 10.1 FL (ref 6–12)
POTASSIUM SERPL-SCNC: 3.9 MMOL/L (ref 3.5–5.2)
RBC # BLD AUTO: 3.67 10*6/MM3 (ref 3.77–5.28)
SODIUM SERPL-SCNC: 140 MMOL/L (ref 136–145)
WBC NRBC COR # BLD AUTO: 6.77 10*3/MM3 (ref 3.4–10.8)

## 2024-04-26 PROCEDURE — 80048 BASIC METABOLIC PNL TOTAL CA: CPT | Performed by: OBSTETRICS & GYNECOLOGY

## 2024-04-26 PROCEDURE — 25010000002 GENTAMICIN PER 80 MG: Performed by: STUDENT IN AN ORGANIZED HEALTH CARE EDUCATION/TRAINING PROGRAM

## 2024-04-26 PROCEDURE — 25810000003 LACTATED RINGERS PER 1000 ML: Performed by: STUDENT IN AN ORGANIZED HEALTH CARE EDUCATION/TRAINING PROGRAM

## 2024-04-26 PROCEDURE — 85027 COMPLETE CBC AUTOMATED: CPT | Performed by: OBSTETRICS & GYNECOLOGY

## 2024-04-26 PROCEDURE — 25010000002 AMPICILLIN PER 500 MG: Performed by: OBSTETRICS & GYNECOLOGY

## 2024-04-26 PROCEDURE — 25010000002 CLINDAMYCIN 900 MG/50ML SOLUTION: Performed by: STUDENT IN AN ORGANIZED HEALTH CARE EDUCATION/TRAINING PROGRAM

## 2024-04-26 RX ADMIN — FAMOTIDINE 20 MG: 20 TABLET, FILM COATED ORAL at 08:06

## 2024-04-26 RX ADMIN — ACETAMINOPHEN 650 MG: 325 TABLET ORAL at 18:05

## 2024-04-26 RX ADMIN — AMPICILLIN SODIUM 2 G: 2 INJECTION, POWDER, FOR SOLUTION INTRAMUSCULAR; INTRAVENOUS at 08:05

## 2024-04-26 RX ADMIN — CLINDAMYCIN PHOSPHATE 900 MG: 900 INJECTION, SOLUTION INTRAVENOUS at 16:00

## 2024-04-26 RX ADMIN — ACETAMINOPHEN 650 MG: 325 TABLET ORAL at 01:39

## 2024-04-26 RX ADMIN — AMPICILLIN SODIUM 2 G: 2 INJECTION, POWDER, FOR SOLUTION INTRAMUSCULAR; INTRAVENOUS at 14:46

## 2024-04-26 RX ADMIN — IBUPROFEN 600 MG: 600 TABLET, FILM COATED ORAL at 17:51

## 2024-04-26 RX ADMIN — Medication 10 ML: at 08:13

## 2024-04-26 RX ADMIN — AMPICILLIN SODIUM 2 G: 2 INJECTION, POWDER, FOR SOLUTION INTRAMUSCULAR; INTRAVENOUS at 01:39

## 2024-04-26 RX ADMIN — SODIUM CHLORIDE, POTASSIUM CHLORIDE, SODIUM LACTATE AND CALCIUM CHLORIDE 125 ML/HR: 600; 310; 30; 20 INJECTION, SOLUTION INTRAVENOUS at 01:39

## 2024-04-26 RX ADMIN — CLINDAMYCIN PHOSPHATE 900 MG: 900 INJECTION, SOLUTION INTRAVENOUS at 01:00

## 2024-04-26 RX ADMIN — AMPICILLIN SODIUM 2 G: 2 INJECTION, POWDER, FOR SOLUTION INTRAMUSCULAR; INTRAVENOUS at 23:30

## 2024-04-26 RX ADMIN — CLINDAMYCIN PHOSPHATE 900 MG: 900 INJECTION, SOLUTION INTRAVENOUS at 08:06

## 2024-04-26 RX ADMIN — Medication 10 ML: at 21:47

## 2024-04-26 RX ADMIN — IBUPROFEN 600 MG: 600 TABLET, FILM COATED ORAL at 05:51

## 2024-04-26 RX ADMIN — GENTAMICIN SULFATE 370 MG: 40 INJECTION, SOLUTION INTRAMUSCULAR; INTRAVENOUS at 14:45

## 2024-04-26 RX ADMIN — IBUPROFEN 600 MG: 600 TABLET, FILM COATED ORAL at 11:49

## 2024-04-26 NOTE — PROGRESS NOTES
[facility]  OB Progress Note    Chief Complaint: Postpartum Endometritis    HD#2     Subjective     Patient reports she is doing much better today. Admits to mild cramping abdominal and pelvic pain but well controlled with medication. Admits to minimal vaginal bleeding. Reports slight vaginal odor but improved from yesterday. Reports no vaginal discharge. Admits to fever and chilling overnight which has subsided this am.     Objective     Vital Signs Range for the last 24 hours  Temp:  [96.9 °F (36.1 °C)-102.6 °F (39.2 °C)] 97.5 °F (36.4 °C)   BP: ()/(57-71) 112/64   Heart Rate:  [] 72   Resp:  [16-18] 17   SpO2:  [97 %-99 %] 97 %       Device (Oxygen Therapy): room air       Intake/Output last 24 hours:      Intake/Output Summary (Last 24 hours) at 4/26/2024 0817  Last data filed at 4/26/2024 0300  Gross per 24 hour   Intake --   Output 1650 ml   Net -1650 ml       Intake/Output this shift:    No intake/output data recorded.    Physical Exam:  General: Patient is comfortable, well appearing, and in no acute distress   Heart CVS exam: No edema.   Lungs Chest: unlabored breathing, no audible wheezes.     Abdomen Abdominal exam: soft, nontender, nondistended, no masses or organomegaly.   Extremities Exam of extremities: peripheral pulses normal, no pedal edema, no clubbing or cyanosis       Laboratory Results:    Assessment/Plan:    Postpartum Endometritis  Acute Cystitis    Current management on 5B  IV antibiotic regimen-amp added yesterday for acute cystitis coverage  Clinda 900mg every 8 hours  Gent 5mg/kg every 24 hour  Amp every 6 hours  Febrile overnight with last Tmax 102.6 (2326). Will continue antibiotics until 24 hours afebrile at a minimum  Labs reviewed-normal other than cystitis. Blood and urine cultures still pending  TVUS reviewed personally by Dr. Ferrara and low likelihood of RPOC given clinical picture and images-suggestive of lower uterine blood and clot  Continue ibuprofen, tylenol,  colace, zofran PRN  Diet: regular  Activity: ad anabel  DVT ppx: SCD    Dispo: continue current care inpatient. Discharge home in 1-2 days pending clinical status        Stephanie Valdivia DO  4/26/2024  08:17 EDT

## 2024-04-26 NOTE — PAYOR COMM NOTE
"Mary AMEZCUA    phone: [phone]  fax: [phone]        Mady Joyce (33 y.o. Female)       Date of Birth   1990    Social Security Number       Address   [address] [place] [address]    Home Phone   [phone]    MRN   [id number]       Hindu   None    Marital Status                               Admission Date   24    Admission Type   Urgent    Admitting Provider   Stephanie Valdivia DO    Attending Provider   Stephanie Valdivia DO    Department, Room/Bed   [address], [address]/1       Discharge Date       Discharge Disposition       Discharge Destination                                 Attending Provider: Stephanie Valdivia DO    Allergies: No Known Allergies    Isolation: None   Infection: None   Code Status: CPR    Ht: 175.3 cm (69\")   Wt: 78.9 kg (174 lb)    Admission Cmt: None   Principal Problem: Postpartum endometritis [O86.12]                   Active Insurance as of 2024       Primary Coverage       Payor Plan Insurance Group Employer/Plan Group    [employer] [place] [employer] [place]        Payor Plan Address Payor Plan Phone Number Payor Plan Fax Number Effective Dates    [address] [address] [phone]  3/5/2021 - None Entered    [facility] [id number]         Subscriber Name Subscriber Birth Date Member ID       MADY JOYCE 1990 [id number]                     Emergency Contacts        (Rel.) Home Phone Work Phone Mobile Phone    TORRI JOYCE (Spouse) [phone] -- --                 History & Physical        Stephanie Valdivia DO at 24 1304          [facility]  GYN History and Physical    Postpartum Endometritis    Subjective    Patient is a 33 y.o. female  PPD#6 s/p  without complications. Patient was allowed to recover on postpartum then discharged home on PPD#2. She was seen in office today with complaints of fevers and overall \"not feeling well\". States that she experienced a " temperature of 102 at home yesterday that came down to normal after ibuprofen and tylenol. She reports that fever was back up to 102 this morning when she woke up. Reports that she has felt achy and has had chills. Reports that vaginal bleeding has been minimal but did pass through a pad this am. Denies lightheadedness, dizziness, syncope. She denies nausea and vomiting. Denies purulent vaginal discharge but reports vaginal odor. No further complaints at this time.            Past OB History:            OB History    Para Term  AB Living   3 3 3 0 0 3   SAB IAB Ectopic Molar Multiple Live Births   0 0 0 0 0 3      # Outcome Date GA Lbr Roberto/2nd Weight Sex Type Anes PTL Lv   3 Term 24 38w2d / 00:04 3020 g (6 lb 10.5 oz) F Vag-Spont EPI N LAI      Name: Diane Joyce      Apgar1: 8  Apgar5: 9   2 Term 21 39w5d 16:16 / 00:13 4060 g (8 lb 15.2 oz) M Vag-Spont EPI N LAI      Name: DOV JOYCE      Apgar1: 8  Apgar5: 9   1 Term 20 40w1d  4082 g (9 lb) M Vag-Spont EPI  LAI     Medications:     Prenatal Vit-Fe Fumarate-FA, acetaminophen, aspirin, docusate sodium, doxylamine, ibuprofen, and promethazine   Allergies:     No Known Allergies   Past Medical History: Past Medical History:   Diagnosis Date    Cystic fibrosis carrier     Ovarian cyst     Seizures     in childhood, outgrew them per patient       Past Surgical History Past Surgical History:   Procedure Laterality Date    WISDOM TOOTH EXTRACTION        Family History: Family History   Problem Relation Age of Onset    Stroke Maternal Grandfather     Cystic fibrosis Son         [id number] mutation      Social History:  reports that she has never smoked. She has never been exposed to tobacco smoke. She has never used smokeless tobacco.   reports no history of alcohol use.   reports no history of drug use.              General ROS: Pertinent items are noted in HPI, all other systems reviewed and negative    Objective-performed in  office     Vital signs stable      Physical Examination:   General appearance - alert, well appearing, and in no distress, oriented to person, place, and time, and normal appearing weight  Chest - unlabored breathing, no audible wheezes  Heart - no edema  Abdomen - soft, nontender, nondistended, no masses or organomegaly  Pelvic - normal external genitalia, vagina, cervix. Tenderness on insertion of speculum and odor noted on exam. Small amount of dark red blood within vaginal vault. Bimanual exam with noted fundal tenderness  Extremities - peripheral pulses normal, no pedal edema, no clubbing or cyanosi        Laboratory Results:    Pending on admission        Assessment/Plan:    1.  Postpartum Endometritis    Recurrent fevers with Tmax 102 most recent today at 0900. Afebrile in office  Exam findings notable for uterine tenderness and vaginal odor  Diagnosis of postpartum endometritis. Patient admitted for IV antibiotics and management  Clindamycin 900mg every 8 hours  Gentamicin 5mg/kg every 24 hours  Will continue antibiotics until 24-48 afebrile with clinical improvement  TVUS ordered to rule out retained products of conceptions  Labs on intake ordered-CBC, CMP, LA, UA, respiratory panel, blood cultures  Ibuprofen, tyelnol, colace, zofran PRN  Diet: NPO - advance to regular pending result of ultrasound imaging   Activity: ad anabel  DVT ppx: SCD    Dispo: admission to  for IV antibiotics and close monitoring. Discussed care with Dr. Ferrara on call.       Stephanie Valdivia DO  4/25/2024  13:09 EDT    Electronically signed by Stephanie Valdivia DO at 04/25/24 1318       Lab Results (last 24 hours)       Procedure Component Value Units Date/Time    Basic Metabolic Panel [[id number]]  (Abnormal) Collected: 04/26/24 0517    Specimen: Blood Updated: 04/26/24 0640     Glucose 102 mg/dL      BUN 11 mg/dL      Creatinine 0.44 mg/dL      Sodium 140 mmol/L      Potassium 3.9 mmol/L      Chloride 105 mmol/L      CO2 25.0  mmol/L      Calcium 8.0 mg/dL      BUN/Creatinine Ratio 25.0     Anion Gap 10.0 mmol/L      eGFR 132.8 mL/min/1.73     Narrative:      GFR Normal >60  Chronic Kidney Disease <60  Kidney Failure <15      CBC (No Diff) [[id number]]  (Abnormal) Collected: 04/26/24 0517    Specimen: Blood Updated: 04/26/24 0612     WBC 6.77 10*3/mm3      RBC 3.67 10*6/mm3      Hemoglobin 11.2 g/dL      Hematocrit 34.2 %      MCV 93.2 fL      MCH 30.5 pg      MCHC 32.7 g/dL      RDW 13.7 %      RDW-SD 47.1 fl      MPV 10.1 fL      Platelets 132 10*3/mm3     Lactic Acid, Plasma [[id number]]  (Normal) Collected: 04/25/24 1845    Specimen: Blood Updated: 04/25/24 1925     Lactate 0.8 mmol/L      Comment: Falsely depressed results may occur on samples drawn from patients receiving N-Acetylcysteine (NAC) or Metamizole.       Urinalysis, Microscopic Only - Urine, Clean Catch [[id number]]  (Abnormal) Collected: 04/25/24 1615    Specimen: Urine, Clean Catch Updated: 04/25/24 1640     RBC, UA 21-50 /HPF      WBC, UA Too Numerous to Count /HPF      Bacteria, UA 4+ /HPF      Squamous Epithelial Cells, UA 3-6 /HPF      Hyaline Casts, UA 0-6 /LPF      Methodology Manual Light Microscopy    Urine Culture - Urine, Urine, Clean Catch [[id number]] Collected: 04/25/24 1615    Specimen: Urine, Clean Catch Updated: 04/25/24 1640    COVID-19 and FLU A/B PCR, 1 HR TAT - Swab, Nasopharynx [[id number]]  (Normal) Collected: 04/25/24 1610    Specimen: Swab from Nasopharynx Updated: 04/25/24 1637     COVID19 Not Detected     Influenza A PCR Not Detected     Influenza B PCR Not Detected    Narrative:      Fact sheet for providers: https://www.fda.gov/media/842844/download    Fact sheet for patients: https://www.fda.gov/media/199118/download    Test performed by PCR.    Urinalysis With Culture If Indicated - Urine, Clean Catch [[id number]]  (Abnormal) Collected: 04/25/24 1615    Specimen: Urine, Clean Catch Updated: 04/25/24 1622     Color, UA Yellow     Appearance, UA  Cloudy     pH, UA 5.5     Specific Gravity, UA 1.018     Glucose,  mg/dL (2+)     Ketones, UA Negative     Bilirubin, UA Negative     Blood, UA Large (3+)     Protein,  mg/dL (2+)     Leuk Esterase, UA Moderate (2+)     Nitrite, UA Positive     Urobilinogen, UA 0.2 E.U./dL    Narrative:      In absence of clinical symptoms, the presence of pyuria, bacteria, and/or nitrites on the urinalysis result does not correlate with infection.    Blood Culture - Blood, Arm, Right [[id number]] Collected: 04/25/24 1350    Specimen: Blood from Arm, Right Updated: 04/25/24 1436    Lactic Acid, Plasma [[id number]]  (Abnormal) Collected: 04/25/24 1350    Specimen: Blood Updated: 04/25/24 1435     Lactate 2.3 mmol/L      Comment: Falsely depressed results may occur on samples drawn from patients receiving N-Acetylcysteine (NAC) or Metamizole.       Comprehensive Metabolic Panel [[id number]]  (Abnormal) Collected: 04/25/24 1350    Specimen: Blood Updated: 04/25/24 1426     Glucose 115 mg/dL      BUN 10 mg/dL      Creatinine 0.46 mg/dL      Sodium 133 mmol/L      Potassium 3.7 mmol/L      Chloride 101 mmol/L      CO2 22.0 mmol/L      Calcium 8.1 mg/dL      Total Protein 6.3 g/dL      Albumin 3.2 g/dL      ALT (SGPT) 13 U/L      AST (SGOT) 18 U/L      Alkaline Phosphatase 99 U/L      Total Bilirubin 0.3 mg/dL      Globulin 3.1 gm/dL      Comment: Calculated Result        A/G Ratio 1.0 g/dL      BUN/Creatinine Ratio 21.7     Anion Gap 10.0 mmol/L      eGFR 131.4 mL/min/1.73     Narrative:      GFR Normal >60  Chronic Kidney Disease <60  Kidney Failure <15      CBC & Differential [[id number]]  (Abnormal) Collected: 04/25/24 1350    Specimen: Blood Updated: 04/25/24 1422    Narrative:      The following orders were created for panel order CBC & Differential.  Procedure                               Abnormality         Status                     ---------                               -----------         ------                      CBC Auto Differential[[id number]]        Abnormal            Final result                 Please view results for these tests on the individual orders.    CBC Auto Differential [[id number]]  (Abnormal) Collected: 04/25/24 1350    Specimen: Blood Updated: 04/25/24 1422     WBC 8.54 10*3/mm3      RBC 4.15 10*6/mm3      Hemoglobin 12.7 g/dL      Hematocrit 39.0 %      MCV 94.0 fL      MCH 30.6 pg      MCHC 32.6 g/dL      RDW 13.6 %      RDW-SD 46.4 fl      MPV 10.4 fL      Platelets 145 10*3/mm3      Neutrophil % 87.1 %      Lymphocyte % 4.6 %      Monocyte % 7.5 %      Eosinophil % 0.1 %      Basophil % 0.2 %      Immature Grans % 0.5 %      Neutrophils, Absolute 7.44 10*3/mm3      Lymphocytes, Absolute 0.39 10*3/mm3      Monocytes, Absolute 0.64 10*3/mm3      Eosinophils, Absolute 0.01 10*3/mm3      Basophils, Absolute 0.02 10*3/mm3      Immature Grans, Absolute 0.04 10*3/mm3      nRBC 0.0 /100 WBC           Imaging Results (Last 24 Hours)       Procedure Component Value Units Date/Time    US Non-ob Transvaginal [[id number]] Collected: 04/25/24 1546     Updated: 04/25/24 1552    Narrative:      US NON-OB TRANSVAGINAL    Date of Exam: 4/25/2024 2:57 PM EDT    Indication: postpartum fever, r/o RPOC.    Comparison: No comparisons available.    Technique: Transvaginal pelvic ultrasound was performed.  Grayscale and color Doppler imaging was utilized to evaluate the pelvic area with image documentation per protocol.  Doppler spectral analysis was performed.      Findings:  The exam is somewhat technically limited secondary to patient's inability to tolerate pressure from the trans vaginal transducer. Patient had vaginal delivery on 4/19/2024. Uterus is enlarged compatible with recent delivery. There is a mixed echogenicity   fluid collection in the lower uterine and endocervical canal measuring 5 cm in length by 3 cm transversely by less than 1 cm AP. The endometrium is thickened, measuring 3 cm in AP thickness. Neither  ovary could be visualized. There is no free pelvic   fluid.      Impression:      Impression:  Somewhat limited exam. Thickened endometrium and small complicated fluid collection of the lower uterine canal suggests retained products of conception.        Electronically Signed: Patt Stapleton MD    4/25/2024 3:48 PM EDT    Workstation ID: [id number]             Physician Progress Notes (last 24 hours)        Stephanie Valdivia, DO at 04/26/24 0816          [facility]  OB Progress Note    Chief Complaint: Postpartum Endometritis    HD#2     Subjective     Patient reports she is doing much better today. Admits to mild cramping abdominal and pelvic pain but well controlled with medication. Admits to minimal vaginal bleeding. Reports slight vaginal odor but improved from yesterday. Reports no vaginal discharge. Admits to fever and chilling overnight which has subsided this am.     Objective     Vital Signs Range for the last 24 hours  Temp:  [96.9 °F (36.1 °C)-102.6 °F (39.2 °C)] 97.5 °F (36.4 °C)   BP: ()/(57-71) 112/64   Heart Rate:  [] 72   Resp:  [16-18] 17   SpO2:  [97 %-99 %] 97 %       Device (Oxygen Therapy): room air       Intake/Output last 24 hours:      Intake/Output Summary (Last 24 hours) at 4/26/2024 0817  Last data filed at 4/26/2024 0300  Gross per 24 hour   Intake --   Output 1650 ml   Net -1650 ml       Intake/Output this shift:    No intake/output data recorded.    Physical Exam:  General: Patient is comfortable, well appearing, and in no acute distress   Heart CVS exam: No edema.   Lungs Chest: unlabored breathing, no audible wheezes.     Abdomen Abdominal exam: soft, nontender, nondistended, no masses or organomegaly.   Extremities Exam of extremities: peripheral pulses normal, no pedal edema, no clubbing or cyanosis       Laboratory Results:    Assessment/Plan:    Postpartum Endometritis  Acute Cystitis    Current management on 5B  IV antibiotic regimen-amp added yesterday for acute  cystitis coverage  Clinda 900mg every 8 hours  Gent 5mg/kg every 24 hour  Amp every 6 hours  Febrile overnight with last Tmax 102.6 (2326). Will continue antibiotics until 24 hours afebrile at a minimum  Labs reviewed-normal other than cystitis. Blood and urine cultures still pending  TVUS reviewed personally by Dr. Ferrara and low likelihood of RPOC given clinical picture and images-suggestive of lower uterine blood and clot  Continue ibuprofen, tylenol, colace, zofran PRN  Diet: regular  Activity: ad anabel  DVT ppx: SCD    Dispo: continue current care inpatient. Discharge home in 1-2 days pending clinical status        Stephanie Valdivia DO  4/26/2024  08:17 EDT    Electronically signed by Stephanie Valdivia DO at 04/26/24 1014       Susan Ferrara MD at 04/25/24 1724          [facility]  GYN Progress Note    Chief Complaint: HD 1    Subjective     Admitted for possible PP endometritis on PPD6 after fever of 102 and body aches/chills at home.   Objective     Vital Signs Range for the last 24 hours  Temp:  [99 °F (37.2 °C)-100.9 °F (38.3 °C)] 99 °F (37.2 °C)   BP: (106-124)/(58-71) 106/58   Heart Rate:  [118-129] 118   Resp:  [18] 18   SpO2:  [97 %-99 %] 97 %       Device (Oxygen Therapy): room air       Intake/Output last 24 hours:    No intake or output data in the 24 hours ending 04/25/24 1724    Intake/Output this shift:    No intake/output data recorded.    Physical Exam:  General: Patient is comfortable   Heart CVS exam: normal rate and regular rhythm.   Lungs Chest: clear to auscultation, no wheezes, rales or rhonchi, symmetric air entry.     Abdomen Abdominal exam: non tender, non distended.   Extremities Exam of extremities: pedal edema  none       Laboratory Results:  WBC   Date Value Ref Range Status   04/25/2024 8.54 3.40 - 10.80 10*3/mm3 Final     RBC   Date Value Ref Range Status   04/25/2024 4.15 3.77 - 5.28 10*6/mm3 Final     Hemoglobin   Date Value Ref Range Status   04/25/2024 12.7 12.0 - 15.9  g/dL Final     Hematocrit   Date Value Ref Range Status   04/25/2024 39.0 34.0 - 46.6 % Final     MCV   Date Value Ref Range Status   04/25/2024 94.0 79.0 - 97.0 fL Final     MCH   Date Value Ref Range Status   04/25/2024 30.6 26.6 - 33.0 pg Final     MCHC   Date Value Ref Range Status   04/25/2024 32.6 31.5 - 35.7 g/dL Final     RDW   Date Value Ref Range Status   04/25/2024 13.6 12.3 - 15.4 % Final     RDW-SD   Date Value Ref Range Status   04/25/2024 46.4 37.0 - 54.0 fl Final     MPV   Date Value Ref Range Status   04/25/2024 10.4 6.0 - 12.0 fL Final     Platelets   Date Value Ref Range Status   04/25/2024 145 140 - 450 10*3/mm3 Final     Neutrophil %   Date Value Ref Range Status   04/25/2024 87.1 (H) 42.7 - 76.0 % Final     Lymphocyte %   Date Value Ref Range Status   04/25/2024 4.6 (L) 19.6 - 45.3 % Final     Monocyte %   Date Value Ref Range Status   04/25/2024 7.5 5.0 - 12.0 % Final     Eosinophil %   Date Value Ref Range Status   04/25/2024 0.1 (L) 0.3 - 6.2 % Final     Basophil %   Date Value Ref Range Status   04/25/2024 0.2 0.0 - 1.5 % Final     Immature Grans %   Date Value Ref Range Status   04/25/2024 0.5 0.0 - 0.5 % Final     Neutrophils, Absolute   Date Value Ref Range Status   04/25/2024 7.44 (H) 1.70 - 7.00 10*3/mm3 Final     Lymphocytes, Absolute   Date Value Ref Range Status   04/25/2024 0.39 (L) 0.70 - 3.10 10*3/mm3 Final     Monocytes, Absolute   Date Value Ref Range Status   04/25/2024 0.64 0.10 - 0.90 10*3/mm3 Final     Eosinophils, Absolute   Date Value Ref Range Status   04/25/2024 0.01 0.00 - 0.40 10*3/mm3 Final     Basophils, Absolute   Date Value Ref Range Status   04/25/2024 0.02 0.00 - 0.20 10*3/mm3 Final     Immature Grans, Absolute   Date Value Ref Range Status   04/25/2024 0.04 0.00 - 0.05 10*3/mm3 Final     nRBC   Date Value Ref Range Status   04/25/2024 0.0 0.0 - 0.2 /100 WBC Final     Lab Results   Component Value Date    GLUCOSE 115 (H) 04/25/2024    BUN 10 04/25/2024     CREATININE 0.46 (L) 04/25/2024    EGFR 131.4 04/25/2024    BCR 21.7 04/25/2024    K 3.7 04/25/2024    CO2 22.0 04/25/2024    CALCIUM 8.1 (L) 04/25/2024    ALBUMIN 3.2 (L) 04/25/2024    BILITOT 0.3 04/25/2024    AST 18 04/25/2024    ALT 13 04/25/2024     Brief Urine Lab Results  (Last result in the past 365 days)        Color   Clarity   Blood   Leuk Est   Nitrite   Protein   CREAT   Urine HCG        04/25/24 1615 Yellow   Cloudy   Large (3+)   Moderate (2+)   Positive   100 mg/dL (2+)                  Lactate 2.3  Blood Cx: pending  Urine Cx: pending    Resp panel neg    TVUS: I personally reviewed imaging. EMS appears clean small amount of fluid collection in cervix/ nicola, is likely clot.       Assessment/Plan:PPD 6 with PP fever   PP Fever:  Acute cystitis: + nitrite on UA, Cx pending. On Gent/Clinda, adding Ampicillin to cover cystitis  Possible Endometritis: imaging low likelihood for RPOC. Bleeding minimal. Will monitor for clinical improvement. On Amp/Gent/Clinda  Elevated Lactate: will rpt in 4 hours. No leukocytosis or abnormal VS.Blood culture pending. No other s/s sepsis  Antipyretics prn    2. Routine PP care  3. Regular diet  4. Ppx: SCD          Susan Ferrara MD  4/25/2024  17:24 EDT     Electronically signed by Susan Ferrara MD at 04/25/24 1835       Consult Notes (last 24 hours)  Notes from 04/25/24 1026 through 04/26/24 1026   No notes of this type exist for this encounter.

## 2024-04-26 NOTE — PLAN OF CARE
Goal Outcome Evaluation:  Plan of Care Reviewed With: patient        Progress: no change  Outcome Evaluation: VSS.  33 year old white female admitted on 4/25/2024 to R/O endometetritis, she is on day 1 of this hospitalization.  She is on PP day7.  Patient has an IV in place and patent infusing without issue.  Patient has had an increase in temp, MD notified orders noted.  She is up ad anabel.  Voiding without issue and adequate amount of UOP.  Patient is U/-3.  Abd is tender with palpations.  Patient has + indicators for sepsis all protocol followed as indicated.  Have administered scheduled and prn medication as indicated.  She has been pumping and feeding infant from bottle.  Family has remained at BS to care for infant.  Will continue to monitor patient throughout the rest of this shift.

## 2024-04-26 NOTE — CASE MANAGEMENT/SOCIAL WORK
Discharge Planning Assessment  [facility]     Patient Name: Mady Brown  MRN: [id number]  Today's Date: 4/26/2024    Admit Date: 4/25/2024    Plan: Home with spouse Danial [phone] transporting   Discharge Needs Assessment       Row Name 04/26/24 1117       Living Environment    People in Home spouse    Name(s) of People in Home spouse Danial [phone]    Primary Care Provided by self    Provides Primary Care For no one    Family Caregiver if Needed spouse    Family Caregiver Names spouse Danial [phone]    Quality of Family Relationships involved;helpful    Able to Return to Prior Arrangements yes       Transition Planning    Transportation Anticipated family or friend will provide  spouse Danial [phone]       Discharge Needs Assessment    Equipment Currently Used at Home none                   Discharge Plan       Row Name 04/26/24 1118       Plan    Plan Home with spouse Danial [phone] transporting    Plan Comments 'er met with patient and spouse Danial Iverson [phone] at bedside. Patient reports functioning independently with ADL's, no DME or HH. Will explore options for PCP. Patient has [facility] Medicaid. Plan is home with spouse Danial [phone] transporting    Final Discharge Disposition Code 01 - home or self-care                  Continued Care and Services - Admitted Since 4/25/2024    No active coordination exists for this encounter.       Expected Discharge Date and Time       Expected Discharge Date Expected Discharge Time    Apr 27, 2024            Demographic Summary       Row Name 04/26/24 1116       General Information    Admission Type inpatient    Referral Source admission list    Reason for Consult discharge planning                   Functional Status       Row Name 04/26/24 1116       Functional Status    Usual Activity Tolerance good    Current Activity Tolerance good       Functional Status, IADL    Medications independent    Meal Preparation independent    Housekeeping  independent    Laundry independent    Shopping independent    IADL Comments functioning independently with ADL's, no DME or HH       Employment/    Employment/ Comments [employer] insurance                   Psychosocial    No documentation.                  Abuse/Neglect    No documentation.                  Legal    No documentation.                  Substance Abuse    No documentation.                  Patient Forms    No documentation.                     EJ Wagner (Kay)

## 2024-04-26 NOTE — PLAN OF CARE
VSS on RA. Pt has remained afebrile through out shift, and denied c/o pain. She met with lactation nurse to discuss pumping/concerns. Continuing IV antibiotics and encouragement of ambulation. Will continue plan of care.

## 2024-04-26 NOTE — LACTATION NOTE
04/26/24 1530   Maternal Information   Person Making Referral other (see comments);physician  (consult)   Maternal Reason for Referral other (see comments);milk supply concern  (pt reports she is pumping at this time. Was pumping 6oz between both breasts, now pumping 2oz.)   Infant Reason for Referral other (see comments)  (infant at peds appt with FOB)   Maternal Assessment   Breast Size Issue none   Breast Shape Bilateral:;round   Breast Density Bilateral:;soft   Nipples Bilateral:;short   Left Nipple Symptoms tender;bruised   Right Nipple Symptoms tender;bruised   Maternal Infant Feeding   Maternal Emotional State independent;receptive;relaxed   Comfort Measures Following Feeding other (see comments)  (soft shells for sore nipples provided)   Additional Documentation Breastfeeding Supplementation (Group)   Breastfeeding Supplementation   Maternal Indication for Supplementation acute illness/condition  (pt on Ampicillin (L-1), Gentamicin (L-2), Clindamycin (L-2) and safe for breastfeeding according to [facility] Mother's Milk. Print-out of information provided.)   Milk Expression/Equipment   Breast Pump Type double electric, personal  (Spectra S2 at bedside; use instructions reviewed; supplies provided: Valeria dish soap, bottles, pumping kit)   Breast Pump Flange Type hard   Breast Pump Flange Size 24 mm   Breast Pumping   Breast Pumping Interventions frequent pumping encouraged  (at baby's feeding times, to encourage breastmilk production)   Lactation Referrals   Lactation Referrals outpatient lactation program     Pumping encouraged every three hours, or at baby's feeding times for optimal milk initiation/production. All questions answered at this time, PRN Lactation Consultant/Clinic contact encouraged

## 2024-04-27 PROBLEM — N30.00 ACUTE CYSTITIS WITHOUT HEMATURIA: Status: ACTIVE | Noted: 2024-04-27

## 2024-04-27 PROBLEM — Z34.93 THIRD TRIMESTER PREGNANCY: Status: RESOLVED | Noted: 2024-04-02 | Resolved: 2024-04-27

## 2024-04-27 PROBLEM — Z34.92 PRENATAL CARE IN SECOND TRIMESTER: Status: RESOLVED | Noted: 2023-11-20 | Resolved: 2024-04-27

## 2024-04-27 PROBLEM — Z34.82 MULTIGRAVIDA IN SECOND TRIMESTER: Status: RESOLVED | Noted: 2023-10-25 | Resolved: 2024-04-27

## 2024-04-27 PROBLEM — R11.0 NAUSEA WITHOUT VOMITING: Status: RESOLVED | Noted: 2023-01-06 | Resolved: 2024-04-27

## 2024-04-27 PROBLEM — Z34.81 MULTIGRAVIDA IN FIRST TRIMESTER: Status: RESOLVED | Noted: 2023-09-20 | Resolved: 2024-04-27

## 2024-04-27 PROBLEM — Z34.90 PREGNANT: Status: RESOLVED | Noted: 2021-11-08 | Resolved: 2024-04-27

## 2024-04-27 PROBLEM — O09.299 HISTORY OF MACROSOMIA IN INFANT IN PRIOR PREGNANCY, CURRENTLY PREGNANT, UNSPECIFIED TRIMESTER: Status: RESOLVED | Noted: 2021-06-07 | Resolved: 2024-04-27

## 2024-04-27 PROBLEM — O36.5990 IUGR (INTRAUTERINE GROWTH RESTRICTION) AFFECTING CARE OF MOTHER: Status: RESOLVED | Noted: 2024-04-19 | Resolved: 2024-04-27

## 2024-04-27 LAB — BACTERIA SPEC AEROBE CULT: ABNORMAL

## 2024-04-27 PROCEDURE — 25010000002 CLINDAMYCIN 900 MG/50ML SOLUTION: Performed by: STUDENT IN AN ORGANIZED HEALTH CARE EDUCATION/TRAINING PROGRAM

## 2024-04-27 PROCEDURE — 25010000002 AMPICILLIN PER 500 MG: Performed by: OBSTETRICS & GYNECOLOGY

## 2024-04-27 RX ADMIN — CLINDAMYCIN PHOSPHATE 900 MG: 900 INJECTION, SOLUTION INTRAVENOUS at 01:48

## 2024-04-27 RX ADMIN — CLINDAMYCIN PHOSPHATE 900 MG: 900 INJECTION, SOLUTION INTRAVENOUS at 08:44

## 2024-04-27 RX ADMIN — AMPICILLIN SODIUM 2 G: 2 INJECTION, POWDER, FOR SOLUTION INTRAMUSCULAR; INTRAVENOUS at 05:29

## 2024-04-27 RX ADMIN — ACETAMINOPHEN 650 MG: 325 TABLET ORAL at 21:02

## 2024-04-27 RX ADMIN — IBUPROFEN 600 MG: 600 TABLET, FILM COATED ORAL at 12:28

## 2024-04-27 RX ADMIN — FAMOTIDINE 20 MG: 20 TABLET, FILM COATED ORAL at 08:44

## 2024-04-27 RX ADMIN — AMPICILLIN SODIUM 2 G: 2 INJECTION, POWDER, FOR SOLUTION INTRAMUSCULAR; INTRAVENOUS at 17:49

## 2024-04-27 RX ADMIN — IBUPROFEN 600 MG: 600 TABLET, FILM COATED ORAL at 00:06

## 2024-04-27 RX ADMIN — IBUPROFEN 600 MG: 600 TABLET, FILM COATED ORAL at 17:49

## 2024-04-27 RX ADMIN — IBUPROFEN 600 MG: 600 TABLET, FILM COATED ORAL at 05:29

## 2024-04-27 RX ADMIN — AMPICILLIN SODIUM 2 G: 2 INJECTION, POWDER, FOR SOLUTION INTRAMUSCULAR; INTRAVENOUS at 12:28

## 2024-04-27 NOTE — PLAN OF CARE
Goal Outcome Evaluation:  Plan of Care Reviewed With: patient, mother           Outcome Evaluation: VSS on RA. Patent IV in place infusing IV fluids. Pt has been up ad anabel, ambulating in room and to toilet regularly. Fundus check at U2. Adequate oral intake with documented UOP. Pt continues to pump her milk and feed to infant at bedside. Family has remained at bedside. Pt in good spirits and denies any pain. Continuing POC and reporting as needed.

## 2024-04-27 NOTE — PROGRESS NOTES
[facility]  Obstetric Progress Note    Chief Complaint: HD 3/ PPD 8    Subjective     Fever last night to 101 at 1900. None since. Feels really well this am. Feels much more like herself. Bleeding is minimal. Denies pain  Objective     Vital Signs Range for the last 24 hours  Temp:  [97.8 °F (36.6 °C)-101.8 °F (38.8 °C)] 98 °F (36.7 °C)   BP: ()/(52-68) 107/68   Heart Rate:  [] 66   Resp:  [16-18] 18   SpO2:  [96 %-99 %] 99 %       Device (Oxygen Therapy): room air       Intake/Output last 24 hours:      Intake/Output Summary (Last 24 hours) at 4/27/2024 1210  Last data filed at 4/27/2024 0900  Gross per 24 hour   Intake 1290 ml   Output 700 ml   Net 590 ml       Intake/Output this shift:    I/O this shift:  In: 1290 [P.O.:1290]  Out: -     Physical Exam:  General: No acute distress   Heart RRR   Lungs CTAB     Abdomen Soft, non tender,   Extremities Exam of extremities: pedal edema tr +       Laboratory Results:  WBC   Date Value Ref Range Status   04/26/2024 6.77 3.40 - 10.80 10*3/mm3 Final     RBC   Date Value Ref Range Status   04/26/2024 3.67 (L) 3.77 - 5.28 10*6/mm3 Final     Hemoglobin   Date Value Ref Range Status   04/26/2024 11.2 (L) 12.0 - 15.9 g/dL Final     Hematocrit   Date Value Ref Range Status   04/26/2024 34.2 34.0 - 46.6 % Final     MCV   Date Value Ref Range Status   04/26/2024 93.2 79.0 - 97.0 fL Final     MCH   Date Value Ref Range Status   04/26/2024 30.5 26.6 - 33.0 pg Final     MCHC   Date Value Ref Range Status   04/26/2024 32.7 31.5 - 35.7 g/dL Final     RDW   Date Value Ref Range Status   04/26/2024 13.7 12.3 - 15.4 % Final     RDW-SD   Date Value Ref Range Status   04/26/2024 47.1 37.0 - 54.0 fl Final     MPV   Date Value Ref Range Status   04/26/2024 10.1 6.0 - 12.0 fL Final     Platelets   Date Value Ref Range Status   04/26/2024 132 (L) 140 - 450 10*3/mm3 Final     Neutrophil %   Date Value Ref Range Status   04/25/2024 87.1 (H) 42.7 - 76.0 % Final     Lymphocyte %    Date Value Ref Range Status   04/25/2024 4.6 (L) 19.6 - 45.3 % Final     Monocyte %   Date Value Ref Range Status   04/25/2024 7.5 5.0 - 12.0 % Final     Eosinophil %   Date Value Ref Range Status   04/25/2024 0.1 (L) 0.3 - 6.2 % Final     Basophil %   Date Value Ref Range Status   04/25/2024 0.2 0.0 - 1.5 % Final     Immature Grans %   Date Value Ref Range Status   04/25/2024 0.5 0.0 - 0.5 % Final     Neutrophils, Absolute   Date Value Ref Range Status   04/25/2024 7.44 (H) 1.70 - 7.00 10*3/mm3 Final     Lymphocytes, Absolute   Date Value Ref Range Status   04/25/2024 0.39 (L) 0.70 - 3.10 10*3/mm3 Final     Monocytes, Absolute   Date Value Ref Range Status   04/25/2024 0.64 0.10 - 0.90 10*3/mm3 Final     Eosinophils, Absolute   Date Value Ref Range Status   04/25/2024 0.01 0.00 - 0.40 10*3/mm3 Final     Basophils, Absolute   Date Value Ref Range Status   04/25/2024 0.02 0.00 - 0.20 10*3/mm3 Final     Immature Grans, Absolute   Date Value Ref Range Status   04/25/2024 0.04 0.00 - 0.05 10*3/mm3 Final     nRBC   Date Value Ref Range Status   04/25/2024 0.0 0.0 - 0.2 /100 WBC Final     Lab Results   Component Value Date    GLUCOSE 102 (H) 04/26/2024    BUN 11 04/26/2024    CREATININE 0.44 (L) 04/26/2024    EGFR 132.8 04/26/2024    BCR 25.0 04/26/2024    K 3.9 04/26/2024    CO2 25.0 04/26/2024    CALCIUM 8.0 (L) 04/26/2024    ALBUMIN 3.2 (L) 04/25/2024    BILITOT 0.3 04/25/2024    AST 18 04/25/2024    ALT 13 04/25/2024     Urine Culture:      ntains abnormal data Urine Culture - Urine, Urine, Clean Catch  Order: [id number] - Reflex for Order [id number]  Status: Final result       Visible to patient: No (scheduled for 4/27/2024 11:38 AM)       Next appt: None    Specimen Information: Urine, Clean Catch   0 Result Notes  Urine Culture >100,000 CFU/mL Escherichia coli Abnormal          Colonization of the urinary tract without infection is common. Treatment is discouraged unless the patient is symptomatic, pregnant, or  undergoing an invasive urologic procedure.        Resulting Agency:  [facility]     Susceptibility     Escherichia coli     VIVI     Amoxicillin + Clavulanate <=2 ug/ml Susceptible     Ampicillin <=2 ug/ml Susceptible     Ampicillin + Sulbactam <=2 ug/ml Susceptible     Cefazolin <=4 ug/ml Susceptible     Cefepime <=1 ug/ml Susceptible     Ceftazidime <=1 ug/ml Susceptible     Ceftriaxone <=1 ug/ml Susceptible     Gentamicin <=1 ug/ml Susceptible     Levofloxacin >=8 ug/ml Resistant     Nitrofurantoin <=16 ug/ml Susceptible     Piperacillin + Tazobactam <=4 ug/ml Susceptible     Trimethoprim + Sulfamethoxazole <=20 ug/ml Susceptible               Linear View         Specimen Collected: 04/25/24 16:15 EDT Last Resulted: 04/27/24 10:38 EDT           Blood Culture; No Growth        Assessment/Plan:     Postpartum Endometritis  Acute Cystitis     Current management on 5B  IV antibiotic regimen-amp added yesterday for acute cystitis coverage  Clinda 900mg every 8 hours  Gent 5mg/kg every 24 hour  Amp every 6 hours  Febrile overnight with last Tmax 101.8 (1930). Will continue antibiotics until 24 hours afebrile at a minimum  Cystitis: Ecoli. Susceptible to current Abx regimen. Transition to po coverage once AF for 24 hr  TVUS reviewed personally by Dr. Ferrara and low likelihood of RPOC given clinical picture and images-suggestive of lower uterine blood and clot  Continue ibuprofen, tylenol, colace, zofran PRN  Diet: regular  Activity: ad anabel  DVT ppx: ALEJANDRO Ferrara MD  4/27/2024  12:10 EDT

## 2024-04-27 NOTE — PLAN OF CARE
Goal Outcome Evaluation:  Plan of Care Reviewed With: patient        Progress: improving  Outcome Evaluation: SS.  33 year old white female admitted on 4/25/2024 to R/O endometetritis, she is on day 1 of this hospitalization.  She is on PP day8.  Patient has an IV in place and patent infusing without issue.  She is up ad anabel.  Voiding without issue and adequate amount of UOP.  Patient is U/-3.  Abd is tender with palpations.  Patient has + indicators for sepsis all protocol followed as indicated.  Have administered scheduled and prn medication as indicated.  She has been pumping and feeding infant from bottle.  Family has remained at BS to care for infant.  Will continue to monitor patient throughout the rest of this shift.

## 2024-04-28 VITALS
WEIGHT: 174 LBS | HEART RATE: 73 BPM | DIASTOLIC BLOOD PRESSURE: 78 MMHG | OXYGEN SATURATION: 98 % | SYSTOLIC BLOOD PRESSURE: 121 MMHG | TEMPERATURE: 97 F | RESPIRATION RATE: 16 BRPM | BODY MASS INDEX: 25.77 KG/M2 | HEIGHT: 69 IN

## 2024-04-28 PROCEDURE — 25810000003 LACTATED RINGERS PER 1000 ML: Performed by: STUDENT IN AN ORGANIZED HEALTH CARE EDUCATION/TRAINING PROGRAM

## 2024-04-28 RX ORDER — CEPHALEXIN 500 MG/1
500 CAPSULE ORAL 4 TIMES DAILY
Qty: 28 CAPSULE | Refills: 0 | Status: SHIPPED | OUTPATIENT
Start: 2024-04-28 | End: 2024-05-05

## 2024-04-28 RX ADMIN — SODIUM CHLORIDE, POTASSIUM CHLORIDE, SODIUM LACTATE AND CALCIUM CHLORIDE 125 ML/HR: 600; 310; 30; 20 INJECTION, SOLUTION INTRAVENOUS at 02:01

## 2024-04-28 RX ADMIN — IBUPROFEN 600 MG: 600 TABLET, FILM COATED ORAL at 11:36

## 2024-04-28 RX ADMIN — Medication 10 ML: at 09:32

## 2024-04-28 RX ADMIN — IBUPROFEN 600 MG: 600 TABLET, FILM COATED ORAL at 05:57

## 2024-04-28 RX ADMIN — FAMOTIDINE 20 MG: 20 TABLET, FILM COATED ORAL at 09:32

## 2024-04-28 RX ADMIN — IBUPROFEN 600 MG: 600 TABLET, FILM COATED ORAL at 00:06

## 2024-04-28 NOTE — DISCHARGE SUMMARY
[facility]  Discharge Summary      Patient: Mady Brown            : 1990  MRN: [id number]  CSN: [id number]  Consult:   Consults       No orders found from 3/27/2024 to 2024.                 Admission  Diagnosis: Postpartum endometritis postpartum fever    Discharge Diagnosis:   Patient Active Problem List   Diagnosis    Cystic fibrosis carrier    Postpartum endometritis    Acute cystitis without hematuria       Date of Admission: 2024    Date of Discharge:  24    Procedures:  none           Service:  Gynecology    Hospital Course:  Admitted with PP fever with suspected endometritis. Triple antibiotic coverage initiated. Workup revealed acute cystitis. E Coli in culture. Pt received IV Antibiotics until 24 hours afebrile when she was transitioned to po coverage. She recovered well and bleeding minimal.     Labs:    Lab Results (last 24 hours)       Procedure Component Value Units Date/Time    Blood Culture - Blood, Arm, Right [[id number]]  (Normal) Collected: 24 1350    Specimen: Blood from Arm, Right Updated: 24 1445     Blood Culture No growth at 2 days    Narrative:      Aerobic Bottle Only    Less than seven (7) mL's of blood was collected.  Insufficient quantity may yield false negative results.          HOSPITAL LABS:  Lab Results   Component Value Date    WBC 6.77 2024    HGB 11.2 (L) 2024    HCT 34.2 2024    MCV 93.2 2024     (L) 2024    CREATININE 0.44 (L) 2024    URICACID 3.0 2024    AST 18 2024    ALT 13 2024     (H) 2024     Results from last 7 days   Lab Units 24  1350   ABO TYPING  A   RH TYPING  Positive   ANTIBODY SCREEN  Negative       IMAGING  US Non-ob Transvaginal    Result Date: 2024  Impression: Impression: Somewhat limited exam. Thickened endometrium and small complicated fluid collection of the lower uterine canal suggests retained products of conception.  Electronically Signed: Patt Stapleton MD  4/25/2024 3:48 PM EDT  Workstation ID: [id number]      Discharge Medications     Discharge Medications        New Medications        Instructions Start Date   cephalexin 500 MG capsule  Commonly known as: Keflex   500 mg, Oral, 4 Times Daily             Continue These Medications        Instructions Start Date   acetaminophen 500 MG tablet  Commonly known as: TYLENOL   500 mg, Oral, Every 6 Hours PRN      ibuprofen 600 MG tablet  Commonly known as: ADVIL,MOTRIN   Take 1 tablet by mouth Every 6 (Six) Hours As Needed for Mild Pain      PRENATAL VITAMIN PO   Oral      Stool Softener 100 MG capsule  Generic drug: docusate sodium   100 mg, Oral, 2 Times Daily             Stop These Medications      aspirin 81 MG chewable tablet     doxylamine 25 MG tablet  Commonly known as: UNISOM     promethazine 25 MG tablet  Commonly known as: PHENERGAN              Allergies: No Known Allergies     Discharge Disposition:  To Home    Discharge Condition:  Stable    Discharge Diet: Regular    Activity at Discharge: pelvic rest, as tolerated,      Follow-up Appointments: as scheduled        Susan Ferrara MD  04/28/24  10:59 EDT

## 2024-04-28 NOTE — PROGRESS NOTES
[facility]  Obstetric Progress Note    Chief Complaint: HD 4/PPD 9    Subjective     No further fevers. Feels well. No complaints.     Objective     Vital Signs Range for the last 24 hours  Temp:  [97 °F (36.1 °C)-98 °F (36.7 °C)] 97 °F (36.1 °C)   BP: (100-121)/(53-78) 121/78   Heart Rate:  [56-73] 73   Resp:  [16-18] 16   SpO2:  [98 %-99 %] 98 %       Device (Oxygen Therapy): room air       Intake/Output last 24 hours:      Intake/Output Summary (Last 24 hours) at 4/28/2024 1057  Last data filed at 4/28/2024 0932  Gross per 24 hour   Intake 960 ml   Output 4200 ml   Net -3240 ml       Intake/Output this shift:    I/O this shift:  In: -   Out: 1000 [Urine:1000]    Physical Exam:  General: No acute distress   Heart RRR   Lungs CTAB     Abdomen Soft, non tender,   Extremities Exam of extremities: no pedal edema noted       Laboratory Results:  No new      Assessment/Plan:     Postpartum Endometritis  Acute Cystitis       Afebrile overnight  DC IV Abx  Cystitis: Ecoli. Susceptible to current Abx regimen. Transition to po coverage   TVUS reviewed personally by Dr. Ferrara and low likelihood of RPOC given clinical picture and images-suggestive of lower uterine blood and clot  Continue ibuprofen, tylenol, colace, zofran PRN  Diet: regular  Activity: ad anabel  DVT ppx: SCD  DC home        Susan Ferrara MD  4/28/2024  10:57 EDT

## 2024-04-28 NOTE — PLAN OF CARE
Goal Outcome Evaluation:  Plan of Care Reviewed With: patient        Progress: improving  Outcome Evaluation: VSS.  33 year old white female admitted on 4/25/2024 to R/O endometetritis, she is on day 3 of this hospitalization.  She is on PP day 9.  Patient has an IV in place and patent infusing without issue.  She is up ad anabel.  Voiding without issue and adequate amount of UOP.  Have administered scheduled and prn medication as indicated.  She has been pumping and feeding infant from bottle.  Family has remained at  to care for infant.  Will continue to monitor patient throughout the rest of this shift.

## 2024-04-28 NOTE — PLAN OF CARE
Goal Outcome Evaluation:               VSS on RA. Denies pain. Infant and spouse at bedside. Discharge information packet sent home with patient. Discharge education included medication changes and side effects, activity restrictions and pelvic rest, when to follow-up with provider. Care ongoing, continue plan of care.

## 2024-04-30 ENCOUNTER — MATERNAL SCREENING (OUTPATIENT)
Dept: CALL CENTER | Facility: HOSPITAL | Age: 34
End: 2024-04-30
Payer: COMMERCIAL

## 2024-04-30 LAB — BACTERIA SPEC AEROBE CULT: NORMAL

## 2024-04-30 NOTE — OUTREACH NOTE
Maternal Screening Survey      Flowsheet Row Responses   Facility patient discharged from? [facility]   Attempt successful? No   Unsuccessful attempts Attempt 2              PANTERA CHAUDHARI - Registered Nurse

## 2024-04-30 NOTE — OUTREACH NOTE
Maternal Screening Survey      Flowsheet Row Responses   Facility patient discharged from? [facility]   Attempt successful? No   Unsuccessful attempts Attempt 1  [left msg on vm]              PANTERA CHAUDHARI - Registered Nurse

## 2024-04-30 NOTE — OUTREACH NOTE
Maternal Screening Survey      Flowsheet Row Responses   Facility patient discharged from? [facility]   Attempt successful? Yes   Call start time 1640   Call end time 164   EPD Scale: Able to Laugh 0-->as much as she always could   EPD Scale: Looked Forward 0-->as much as she ever did   EPD Scale: Blamed Self 0-->no, never   EPD Scale: Been Anxious 2-->yes, sometimes   EPD Scale: Felt Panicky 0-->no, not at all   EPD Scale: Things Getting on Top 0-->no, has been coping as well as ever   EPD Scale: Difficulty Sleeping 0-->no, not at all   EPD Scale: Sad or Miserable 0-->no, not at all   EPD Scale: Crying 1-->only occasionally   EPD Scale: Thought of Harming Self 0-->never   [place]  Depression Score 3   Did any of your parents have problems with alcohol or drug use? No   Do any of your peers have problems with alcohol or drug use? No   Does your partner have problems with alcohol or drug use? No   Before you were pregnant did you have problems with alcohol or drug use? (past) No   In the past month, did you drink beer, wine, liquor or use any other drugs? (pregnancy) No   Maternal Screening call completed Yes   Call end time 164              PANTERA CHAUDHARI - Registered Nurse

## 2024-05-16 ENCOUNTER — HOSPITAL ENCOUNTER (EMERGENCY)
Facility: HOSPITAL | Age: 34
Discharge: HOME | End: 2024-05-16
Attending: EMERGENCY MEDICINE
Payer: COMMERCIAL

## 2024-05-16 VITALS
HEIGHT: 69 IN | BODY MASS INDEX: 24.88 KG/M2 | SYSTOLIC BLOOD PRESSURE: 109 MMHG | HEART RATE: 120 BPM | OXYGEN SATURATION: 95 % | TEMPERATURE: 102.9 F | DIASTOLIC BLOOD PRESSURE: 66 MMHG | RESPIRATION RATE: 18 BRPM | WEIGHT: 168 LBS

## 2024-05-16 DIAGNOSIS — N39.0 ACUTE URINARY TRACT INFECTION: ICD-10-CM

## 2024-05-16 DIAGNOSIS — N61.0 MASTITIS IN FEMALE: Primary | ICD-10-CM

## 2024-05-16 LAB
BACTERIA UR QL AUTO: ABNORMAL /HPF
BILIRUB UR QL STRIP: NEGATIVE
CLARITY UR: ABNORMAL
COLOR UR: YELLOW
FLUAV SUBTYP SPEC NAA+PROBE: NOT DETECTED
FLUBV RNA ISLT QL NAA+PROBE: NOT DETECTED
GLUCOSE UR STRIP-MCNC: NEGATIVE MG/DL
HGB UR QL STRIP.AUTO: ABNORMAL
HYALINE CASTS UR QL AUTO: ABNORMAL /LPF
KETONES UR QL STRIP: ABNORMAL
LEUKOCYTE ESTERASE UR QL STRIP.AUTO: ABNORMAL
NITRITE UR QL STRIP: POSITIVE
PH UR STRIP.AUTO: 6 [PH] (ref 5–8)
PROT UR QL STRIP: ABNORMAL
RBC # UR STRIP: ABNORMAL /HPF
REF LAB TEST METHOD: ABNORMAL
SARS-COV-2 RNA RESP QL NAA+PROBE: NOT DETECTED
SP GR UR STRIP: 1.01 (ref 1–1.03)
SQUAMOUS #/AREA URNS HPF: ABNORMAL /HPF
UROBILINOGEN UR QL STRIP: ABNORMAL
WBC # UR STRIP: ABNORMAL /HPF

## 2024-05-16 PROCEDURE — 99283 EMERGENCY DEPT VISIT LOW MDM: CPT

## 2024-05-16 PROCEDURE — 87086 URINE CULTURE/COLONY COUNT: CPT | Performed by: EMERGENCY MEDICINE

## 2024-05-16 PROCEDURE — 87636 SARSCOV2 & INF A&B AMP PRB: CPT | Performed by: EMERGENCY MEDICINE

## 2024-05-16 PROCEDURE — 87186 SC STD MICRODIL/AGAR DIL: CPT | Performed by: EMERGENCY MEDICINE

## 2024-05-16 PROCEDURE — 87077 CULTURE AEROBIC IDENTIFY: CPT | Performed by: EMERGENCY MEDICINE

## 2024-05-16 PROCEDURE — 81001 URINALYSIS AUTO W/SCOPE: CPT | Performed by: EMERGENCY MEDICINE

## 2024-05-16 RX ORDER — ACETAMINOPHEN 500 MG
1000 TABLET ORAL ONCE
Status: COMPLETED | OUTPATIENT
Start: 2024-05-16 | End: 2024-05-16

## 2024-05-16 RX ORDER — SULFAMETHOXAZOLE AND TRIMETHOPRIM 800; 160 MG/1; MG/1
2 TABLET ORAL 2 TIMES DAILY
Qty: 28 TABLET | Refills: 0 | Status: SHIPPED | OUTPATIENT
Start: 2024-05-16 | End: 2024-05-23

## 2024-05-16 RX ORDER — IBUPROFEN 600 MG/1
600 TABLET ORAL ONCE
Status: COMPLETED | OUTPATIENT
Start: 2024-05-16 | End: 2024-05-16

## 2024-05-16 RX ORDER — SULFAMETHOXAZOLE AND TRIMETHOPRIM 800; 160 MG/1; MG/1
2 TABLET ORAL ONCE
Status: COMPLETED | OUTPATIENT
Start: 2024-05-16 | End: 2024-05-16

## 2024-05-16 RX ADMIN — IBUPROFEN 600 MG: 600 TABLET, FILM COATED ORAL at 19:25

## 2024-05-16 RX ADMIN — ACETAMINOPHEN 1000 MG: 500 TABLET ORAL at 21:35

## 2024-05-16 RX ADMIN — SULFAMETHOXAZOLE AND TRIMETHOPRIM 2 TABLET: 800; 160 TABLET ORAL at 21:35

## 2024-05-16 NOTE — ED PROVIDER NOTES
Subjective   History of Present Illness  33-year-old female who presents for evaluation of fever, breast pain, and concern for mastitis.  The patient reports that she delivered vaginally roughly 4 weeks ago.  She states that she actually had to return and be admitted to the hospital for short period of time because of urinary tract infection roughly 2 weeks ago.  She reports that the day she recorded a fever greater than 103.  She presented to labor and delivery and they concerned about mastitis and advised her to present here to the ER.  She denies upper respiratory congestion, sore throat, rhinorrhea.  No urinary symptoms include no dysuria.  No diarrhea or blood in the stool.  No other acute complaints.  No sick contacts reported.      Review of Systems   Constitutional:  Positive for fever. Negative for chills and fatigue.   HENT:  Negative for congestion, ear pain, postnasal drip, sinus pressure and sore throat.    Eyes:  Negative for pain, redness and visual disturbance.   Respiratory:  Negative for cough, chest tightness and shortness of breath.    Cardiovascular:  Negative for chest pain, palpitations and leg swelling.   Gastrointestinal:  Negative for abdominal pain, anal bleeding, blood in stool, diarrhea, nausea and vomiting.   Endocrine: Negative for polydipsia and polyuria.   Genitourinary:  Negative for difficulty urinating, dysuria, frequency and urgency.   Musculoskeletal:  Negative for arthralgias, back pain and neck pain.   Skin:  Positive for color change and wound. Negative for pallor and rash.   Allergic/Immunologic: Negative for environmental allergies and immunocompromised state.   Neurological:  Negative for dizziness, weakness and headaches.   Hematological:  Negative for adenopathy.   Psychiatric/Behavioral:  Negative for confusion, self-injury and suicidal ideas. The patient is not nervous/anxious.    All other systems reviewed and are negative.      Past Medical History:   Diagnosis Date     Cystic fibrosis carrier     Ovarian cyst     Seizures     in childhood, outgrew them per patient        No Known Allergies    Past Surgical History:   Procedure Laterality Date    WISDOM TOOTH EXTRACTION         Family History   Problem Relation Age of Onset    Stroke Maternal Grandfather     Cystic fibrosis Son         [id number] mutation       Social History     Socioeconomic History    Marital status:     Number of children: 1   Tobacco Use    Smoking status: Never     Passive exposure: Never    Smokeless tobacco: Never   Vaping Use    Vaping status: Never Used   Substance and Sexual Activity    Alcohol use: Never    Drug use: Never    Sexual activity: Yes     Partners: Male     Birth control/protection: None           Objective   Physical Exam  Vitals and nursing note reviewed.   Constitutional:       General: She is not in acute distress.     Appearance: Normal appearance. She is well-developed. She is not toxic-appearing or diaphoretic.      Comments: Warm to touch   HENT:      Head: Normocephalic and atraumatic.      Right Ear: External ear normal.      Left Ear: External ear normal.      Nose: Nose normal.   Eyes:      General: Lids are normal.      Pupils: Pupils are equal, round, and reactive to light.   Neck:      Trachea: No tracheal deviation.   Cardiovascular:      Rate and Rhythm: Regular rhythm. Tachycardia present.      Pulses: No decreased pulses.      Heart sounds: Normal heart sounds. No murmur heard.     No friction rub. No gallop.   Pulmonary:      Effort: Pulmonary effort is normal. No respiratory distress.      Breath sounds: Normal breath sounds. No decreased breath sounds, wheezing, rhonchi or rales.   Abdominal:      General: Bowel sounds are normal.      Palpations: Abdomen is soft.      Tenderness: There is no abdominal tenderness. There is no guarding or rebound.   Musculoskeletal:         General: No deformity. Normal range of motion.      Cervical back: Normal range of motion and  neck supple.   Lymphadenopathy:      Cervical: No cervical adenopathy.   Skin:     General: Skin is warm and dry.      Findings: No rash.   Neurological:      Mental Status: She is alert and oriented to person, place, and time.      Cranial Nerves: No cranial nerve deficit.      Sensory: No sensory deficit.   Psychiatric:         Speech: Speech normal.         Behavior: Behavior normal.         Thought Content: Thought content normal.         Judgment: Judgment normal.         Procedures           ED Course                                             Medical Decision Making  Differential diagnosis includes urinary tract infection, mastitis, pneumonia, viral illness, other unspecified etiology.    Urine is concerning for infection with positive nitrite, 4+ bacteria with too numerous to count white cells.    COVID and flu test are negative.    Lungs are clear to auscultation diffusely.    On breast exam the patient reports tenderness but no significant redness was identified and there is no purulent drainage.    The patient will be discharged with a course of antibiotics that we will cover for urinary tract infection and will also cover for mastitis.    The patient will be advised to follow-up with primary care physician for recheck in 1 to 2 days.    Alternate Tylenol and ibuprofen for fever control and make sure to drink plenty of fluids.    Problems Addressed:  Acute urinary tract infection: complicated acute illness or injury with systemic symptoms that poses a threat to life or bodily functions  Mastitis in female: complicated acute illness or injury with systemic symptoms that poses a threat to life or bodily functions    Amount and/or Complexity of Data Reviewed  External Data Reviewed: labs.  Labs: ordered. Decision-making details documented in ED Course.    Risk  OTC drugs.  Prescription drug management.        Final diagnoses:   Mastitis in female   Acute urinary tract infection       ED Disposition  ED  Disposition       ED Disposition   Discharge    Condition   Stable    Comment   --               No follow-up provider specified.       Medication List        New Prescriptions      sulfamethoxazole-trimethoprim 800-160 MG per tablet  Commonly known as: BACTRIM DS,SEPTRA DS  Take 2 tablets by mouth 2 (Two) Times a Day for 7 days.               Where to Get Your Medications        These medications were sent to [place], [place] - [address] [facility] - [phone] [place] [id number]   [address] [facility] [address]      Phone: [phone]   sulfamethoxazole-trimethoprim 800-160 MG per tablet            Chikis Colindres MD  05/18/24 4809

## 2024-05-16 NOTE — Clinical Note
[facility] EMERGENCY DEPARTMENT  [address]  [facility] [address]  Phone: [phone]    Mady Brown was seen and treated in our emergency department on 5/16/2024.  She may return to work on 05/20/2024.         Thank you for choosing [facility].    Chikis Colindres MD

## 2024-05-17 NOTE — DISCHARGE INSTRUCTIONS
Alternate Tylenol and ibuprofen every 3 hours.    Sure to drink plenty of fluids.    Take antibiotics as prescribed.    Follow-up with obstetrician within the next 2 days.    Return to the ER with any further concern.

## 2024-05-18 LAB — BACTERIA SPEC AEROBE CULT: ABNORMAL

## 2025-05-21 ENCOUNTER — LAB (OUTPATIENT)
Dept: LAB | Facility: HOSPITAL | Age: 35
End: 2025-05-21
Payer: COMMERCIAL

## 2025-05-21 ENCOUNTER — TRANSCRIBE ORDERS (OUTPATIENT)
Dept: LAB | Facility: HOSPITAL | Age: 35
End: 2025-05-21
Payer: COMMERCIAL

## 2025-05-21 DIAGNOSIS — N92.6 IRREGULAR MENSTRUAL CYCLE: Primary | ICD-10-CM

## 2025-05-21 DIAGNOSIS — N92.6 IRREGULAR MENSTRUAL CYCLE: ICD-10-CM

## 2025-05-21 LAB — HCG INTACT+B SERPL-ACNC: 262 MIU/ML

## 2025-05-21 PROCEDURE — 84702 CHORIONIC GONADOTROPIN TEST: CPT

## 2025-05-21 PROCEDURE — 36415 COLL VENOUS BLD VENIPUNCTURE: CPT

## 2025-05-23 ENCOUNTER — TRANSCRIBE ORDERS (OUTPATIENT)
Dept: LAB | Facility: HOSPITAL | Age: 35
End: 2025-05-23
Payer: COMMERCIAL

## 2025-05-23 ENCOUNTER — LAB (OUTPATIENT)
Dept: LAB | Facility: HOSPITAL | Age: 35
End: 2025-05-23
Payer: COMMERCIAL

## 2025-05-23 DIAGNOSIS — N92.6 IRREGULAR MENSTRUAL CYCLE: Primary | ICD-10-CM

## 2025-05-23 DIAGNOSIS — N92.6 IRREGULAR MENSTRUAL CYCLE: ICD-10-CM

## 2025-05-23 PROCEDURE — 36415 COLL VENOUS BLD VENIPUNCTURE: CPT

## 2025-05-23 PROCEDURE — 84144 ASSAY OF PROGESTERONE: CPT

## 2025-05-23 PROCEDURE — 84702 CHORIONIC GONADOTROPIN TEST: CPT

## 2025-05-24 LAB
HCG INTACT+B SERPL-ACNC: 679 MIU/ML
PROGEST SERPL-MCNC: 9.81 NG/ML

## 2025-05-28 ENCOUNTER — TRANSCRIBE ORDERS (OUTPATIENT)
Dept: LAB | Facility: HOSPITAL | Age: 35
End: 2025-05-28
Payer: COMMERCIAL

## 2025-05-28 ENCOUNTER — LAB (OUTPATIENT)
Dept: LAB | Facility: HOSPITAL | Age: 35
End: 2025-05-28
Payer: COMMERCIAL

## 2025-05-28 DIAGNOSIS — N92.6 MISSED MENSES: Primary | ICD-10-CM

## 2025-05-28 DIAGNOSIS — N92.6 MISSED MENSES: ICD-10-CM

## 2025-05-28 LAB — HCG INTACT+B SERPL-ACNC: 4090 MIU/ML

## 2025-05-28 PROCEDURE — 84702 CHORIONIC GONADOTROPIN TEST: CPT

## 2025-05-28 PROCEDURE — 36415 COLL VENOUS BLD VENIPUNCTURE: CPT

## 2025-07-07 ENCOUNTER — TRANSCRIBE ORDERS (OUTPATIENT)
Dept: LAB | Facility: HOSPITAL | Age: 35
End: 2025-07-07
Payer: COMMERCIAL

## 2025-07-07 ENCOUNTER — LAB (OUTPATIENT)
Dept: LAB | Facility: HOSPITAL | Age: 35
End: 2025-07-07
Payer: COMMERCIAL

## 2025-07-07 DIAGNOSIS — Z3A.11 11 WEEKS GESTATION OF PREGNANCY: Primary | ICD-10-CM

## 2025-07-07 DIAGNOSIS — Z3A.11 11 WEEKS GESTATION OF PREGNANCY: ICD-10-CM

## 2025-07-07 LAB
ABO GROUP BLD: NORMAL
AMPHET+METHAMPHET UR QL: NEGATIVE
AMPHETAMINES UR QL: NEGATIVE
BARBITURATES UR QL SCN: NEGATIVE
BENZODIAZ UR QL SCN: NEGATIVE
BLD GP AB SCN SERPL QL: NEGATIVE
BUPRENORPHINE SERPL-MCNC: NEGATIVE NG/ML
CANNABINOIDS SERPL QL: NEGATIVE
COCAINE UR QL: NEGATIVE
FENTANYL UR-MCNC: NEGATIVE NG/ML
METHADONE UR QL SCN: NEGATIVE
OPIATES UR QL: NEGATIVE
OXYCODONE UR QL SCN: NEGATIVE
PCP UR QL SCN: NEGATIVE
RH BLD: POSITIVE
TRICYCLICS UR QL SCN: NEGATIVE

## 2025-07-07 PROCEDURE — 86780 TREPONEMA PALLIDUM: CPT

## 2025-07-07 PROCEDURE — G0432 EIA HIV-1/HIV-2 SCREEN: HCPCS

## 2025-07-07 PROCEDURE — 87086 URINE CULTURE/COLONY COUNT: CPT

## 2025-07-07 PROCEDURE — 86787 VARICELLA-ZOSTER ANTIBODY: CPT

## 2025-07-07 PROCEDURE — 86850 RBC ANTIBODY SCREEN: CPT

## 2025-07-07 PROCEDURE — 86762 RUBELLA ANTIBODY: CPT

## 2025-07-07 PROCEDURE — 87340 HEPATITIS B SURFACE AG IA: CPT

## 2025-07-07 PROCEDURE — 83020 HEMOGLOBIN ELECTROPHORESIS: CPT

## 2025-07-07 PROCEDURE — 80307 DRUG TEST PRSMV CHEM ANLYZR: CPT

## 2025-07-07 PROCEDURE — 87591 N.GONORRHOEAE DNA AMP PROB: CPT

## 2025-07-07 PROCEDURE — 85027 COMPLETE CBC AUTOMATED: CPT

## 2025-07-07 PROCEDURE — 86803 HEPATITIS C AB TEST: CPT

## 2025-07-07 PROCEDURE — 86901 BLOOD TYPING SEROLOGIC RH(D): CPT

## 2025-07-07 PROCEDURE — 36415 COLL VENOUS BLD VENIPUNCTURE: CPT

## 2025-07-07 PROCEDURE — 86900 BLOOD TYPING SEROLOGIC ABO: CPT

## 2025-07-07 PROCEDURE — 87491 CHLMYD TRACH DNA AMP PROBE: CPT

## 2025-07-08 LAB
DEPRECATED RDW RBC AUTO: 42.3 FL (ref 37–54)
ERYTHROCYTE [DISTWIDTH] IN BLOOD BY AUTOMATED COUNT: 12.8 % (ref 12.3–15.4)
HBV SURFACE AG SERPL QL IA: NORMAL
HCT VFR BLD AUTO: 44.3 % (ref 34–46.6)
HCV AB SER QL: NORMAL
HGB BLD-MCNC: 14.4 G/DL (ref 12–15.9)
HIV 1+2 AB+HIV1 P24 AG SERPL QL IA: NORMAL
MCH RBC QN AUTO: 29.3 PG (ref 26.6–33)
MCHC RBC AUTO-ENTMCNC: 32.5 G/DL (ref 31.5–35.7)
MCV RBC AUTO: 90.2 FL (ref 79–97)
PLATELET # BLD AUTO: 207 10*3/MM3 (ref 140–450)
PMV BLD AUTO: 11.1 FL (ref 6–12)
RBC # BLD AUTO: 4.91 10*6/MM3 (ref 3.77–5.28)
TREPONEMA PALLIDUM IGG+IGM AB [PRESENCE] IN SERUM OR PLASMA BY IMMUNOASSAY: NORMAL
WBC NRBC COR # BLD AUTO: 6.7 10*3/MM3 (ref 3.4–10.8)

## 2025-07-09 LAB
BACTERIA SPEC AEROBE CULT: NORMAL
HGB A MFR BLD ELPH: 96.5 % (ref 96.4–98.8)
HGB A2 MFR BLD ELPH: 2.9 % (ref 1.8–3.2)
HGB F MFR BLD ELPH: 0.6 % (ref 0–2)
HGB FRACT BLD-IMP: NORMAL
HGB S MFR BLD ELPH: 0 %
RUBV IGG SERPL IA-ACNC: 2.87 INDEX
VZV IGG SER QL IA: REACTIVE

## 2025-07-10 LAB
C TRACH RRNA SPEC QL NAA+PROBE: NEGATIVE
N GONORRHOEA RRNA SPEC QL NAA+PROBE: NEGATIVE